# Patient Record
Sex: MALE | Race: WHITE | Employment: FULL TIME | URBAN - METROPOLITAN AREA
[De-identification: names, ages, dates, MRNs, and addresses within clinical notes are randomized per-mention and may not be internally consistent; named-entity substitution may affect disease eponyms.]

---

## 2017-10-16 ENCOUNTER — OFFICE VISIT (OUTPATIENT)
Dept: INTERNAL MEDICINE CLINIC | Age: 32
End: 2017-10-16

## 2017-10-16 VITALS
DIASTOLIC BLOOD PRESSURE: 80 MMHG | HEIGHT: 66 IN | HEART RATE: 72 BPM | WEIGHT: 161 LBS | RESPIRATION RATE: 16 BRPM | SYSTOLIC BLOOD PRESSURE: 122 MMHG | BODY MASS INDEX: 25.88 KG/M2

## 2017-10-16 DIAGNOSIS — Z00.00 ANNUAL PHYSICAL EXAM: Primary | ICD-10-CM

## 2017-10-16 DIAGNOSIS — F43.22 ADJUSTMENT DISORDER WITH ANXIETY: ICD-10-CM

## 2017-10-16 DIAGNOSIS — R53.83 FATIGUE, UNSPECIFIED TYPE: ICD-10-CM

## 2017-10-16 DIAGNOSIS — Z11.4 SCREENING FOR HIV (HUMAN IMMUNODEFICIENCY VIRUS): ICD-10-CM

## 2017-10-16 LAB
BASOPHILS ABSOLUTE: 0.1 K/UL (ref 0–0.2)
BASOPHILS RELATIVE PERCENT: 0.5 %
EOSINOPHILS ABSOLUTE: 0.2 K/UL (ref 0–0.6)
EOSINOPHILS RELATIVE PERCENT: 1.8 %
HCT VFR BLD CALC: 46.7 % (ref 40.5–52.5)
HEMOGLOBIN: 16 G/DL (ref 13.5–17.5)
LYMPHOCYTES ABSOLUTE: 3.4 K/UL (ref 1–5.1)
LYMPHOCYTES RELATIVE PERCENT: 28.7 %
MCH RBC QN AUTO: 31.3 PG (ref 26–34)
MCHC RBC AUTO-ENTMCNC: 34.2 G/DL (ref 31–36)
MCV RBC AUTO: 91.5 FL (ref 80–100)
MONOCYTES ABSOLUTE: 1 K/UL (ref 0–1.3)
MONOCYTES RELATIVE PERCENT: 8 %
NEUTROPHILS ABSOLUTE: 7.3 K/UL (ref 1.7–7.7)
NEUTROPHILS RELATIVE PERCENT: 61 %
PDW BLD-RTO: 14.3 % (ref 12.4–15.4)
PLATELET # BLD: 169 K/UL (ref 135–450)
PMV BLD AUTO: 10.6 FL (ref 5–10.5)
RBC # BLD: 5.1 M/UL (ref 4.2–5.9)
WBC # BLD: 12 K/UL (ref 4–11)

## 2017-10-16 PROCEDURE — 36415 COLL VENOUS BLD VENIPUNCTURE: CPT | Performed by: INTERNAL MEDICINE

## 2017-10-16 PROCEDURE — 99213 OFFICE O/P EST LOW 20 MIN: CPT | Performed by: INTERNAL MEDICINE

## 2017-10-16 PROCEDURE — 99395 PREV VISIT EST AGE 18-39: CPT | Performed by: INTERNAL MEDICINE

## 2017-10-16 RX ORDER — ESCITALOPRAM OXALATE 10 MG/1
10 TABLET ORAL DAILY
Qty: 30 TABLET | Refills: 0 | Status: SHIPPED | OUTPATIENT
Start: 2017-10-16 | End: 2018-05-03

## 2017-10-16 NOTE — PATIENT INSTRUCTIONS
Preventive plan of care for Heather Purvis        10/16/2017           Preventive Measures Status       Recommendations for screening           Diabetes Screen  Glucose (mg/dL)   Date Value   11/16/2016 87    Test recommended and ordered   Cholesterol Screen  Lab Results   Component Value Date    CHOL 130 11/16/2016    TRIG 117 11/16/2016    HDL 37 (L) 11/16/2016    LDLCALC 70 11/16/2016    Test recommended and ordered   Aspirin for Cardiovascular Prevention   No Not indicated   Weight: Body mass index is 25.99 kg/m².   5' 6\" (1.676 m)161 lb (73 kg)  Your BMI is between 18.5 and 24.9, which indicates that you are at a healthy weight and Your BMI is 25 or greater, which indicates that you are overweight   Living Will: No Full Code    Recommended Immunizations    Immunization History   Administered Date(s) Administered    Influenza Virus Vaccine 09/01/2017    Influenza, Shobhaelin Oakley, 3 yrs and older, IM, Preservative Free 10/05/2016    Tdap (Boostrix, Adacel) 10/05/2016    Influenza vaccine:  recommended every fall         Other Recommendations ·   See a dentist every 6 months  · Try to get at least 30 minutes of exercise 3-5 days per week  · Always wear a seat belt when traveling in a car  · Always wear a helmet when riding a bicycle or motorcycle  · When exposed to the sun, use a sunscreen that protects against both UVA and UVB radiation with an SPF of 30 or greater- reapply every 2 to 3 hours or after sweating, drying off with a towel, or swimming

## 2017-10-16 NOTE — PROGRESS NOTES
[facility] Primary [facility]  History and Physical  Jay Riley M.D. Guzman Banks  YOB: 1985    Date of Service:  10/16/2017    Chief Complaint:   Guzman Banks is a 32 y.o. male who presents for   Chief Complaint   Patient presents with    Annual Exam    Anxiety       HPI: Here for Annual Physical and Follow up. He complains getting irritated easily when someone gets mad at him and dwell on it for days. He was on Celexa 20 mg qd only for a few weeks last year and says it didn't help. He's depressed since divorce 4 months ago and his kids moved to [place] for about 3 months. He was started on Celexa 20 mg qd in the ER about 2 months ago and felt calm on it but ran out about 3 weeks ago. He denies SI/HI. He lost about 20 # with diarrhea initially due to anxiety but better now. He also complains of being tired all the time and occasionally ED when he's not really \"in the moment\" when he's having intercourse. ADD: stable off Vyvanse. His job is routine and not need any medication.     No results found for: LABA1C, LABMICR  Lab Results   Component Value Date     11/16/2016    K 4.1 11/16/2016     11/16/2016    CO2 26 11/16/2016    BUN 13 11/16/2016    CREATININE 0.7 (L) 11/16/2016    GLUCOSE 87 11/16/2016    CALCIUM 9.7 11/16/2016     Lab Results   Component Value Date    CHOL 130 11/16/2016    TRIG 117 11/16/2016    HDL 37 11/16/2016    LDLCALC 70 11/16/2016     Lab Results   Component Value Date    ALT 23 11/16/2016    AST 26 11/16/2016     No results found for: TSH, T4FREE  Lab Results   Component Value Date    WBC 13.4 (H) 11/16/2016    HGB 15.9 11/16/2016    HCT 47.2 11/16/2016    MCV 90.1 11/16/2016     11/16/2016     No results found for: INR   No results found for: PSA   No results found for: LABURIC     Wt Readings from Last 3 Encounters:   10/16/17 161 lb (73 kg)   10/15/17 165 lb (74.8 kg)   04/13/17 170 lb (77.1 kg)     BP Readings from Free  -     TSH without Reflex  -     Vitamin D 25 Hydroxy  -     Vitamin B12 & Folate        Return 1 month on anxiety.

## 2017-10-17 LAB
A/G RATIO: 2.1 (ref 1.1–2.2)
ALBUMIN SERPL-MCNC: 5 G/DL (ref 3.4–5)
ALP BLD-CCNC: 102 U/L (ref 40–129)
ALT SERPL-CCNC: 19 U/L (ref 10–40)
ANION GAP SERPL CALCULATED.3IONS-SCNC: 17 MMOL/L (ref 3–16)
AST SERPL-CCNC: 19 U/L (ref 15–37)
BILIRUB SERPL-MCNC: 0.4 MG/DL (ref 0–1)
BUN BLDV-MCNC: 12 MG/DL (ref 7–20)
CALCIUM SERPL-MCNC: 9.8 MG/DL (ref 8.3–10.6)
CHLORIDE BLD-SCNC: 100 MMOL/L (ref 99–110)
CHOLESTEROL, TOTAL: 146 MG/DL (ref 0–199)
CO2: 25 MMOL/L (ref 21–32)
CREAT SERPL-MCNC: 0.6 MG/DL (ref 0.9–1.3)
FOLATE: 3.78 NG/ML (ref 4.78–24.2)
GFR AFRICAN AMERICAN: >60
GFR NON-AFRICAN AMERICAN: >60
GLOBULIN: 2.4 G/DL
GLUCOSE BLD-MCNC: 76 MG/DL (ref 70–99)
HDLC SERPL-MCNC: 40 MG/DL (ref 40–60)
HIV-1 AND HIV-2 ANTIBODIES: NORMAL
LDL CHOLESTEROL CALCULATED: 91 MG/DL
POTASSIUM SERPL-SCNC: 4.3 MMOL/L (ref 3.5–5.1)
SODIUM BLD-SCNC: 142 MMOL/L (ref 136–145)
T4 FREE: 1.2 NG/DL (ref 0.9–1.8)
TOTAL PROTEIN: 7.4 G/DL (ref 6.4–8.2)
TRIGL SERPL-MCNC: 75 MG/DL (ref 0–150)
TSH SERPL DL<=0.05 MIU/L-ACNC: 3.3 UIU/ML (ref 0.27–4.2)
VITAMIN B-12: 915 PG/ML (ref 211–911)
VITAMIN D 25-HYDROXY: 15.6 NG/ML
VLDLC SERPL CALC-MCNC: 15 MG/DL

## 2017-10-23 DIAGNOSIS — R53.83 FATIGUE, UNSPECIFIED TYPE: Primary | ICD-10-CM

## 2017-10-26 LAB — TESTOSTERONE TOTAL: 533 NG/DL (ref 220–1000)

## 2019-10-22 ENCOUNTER — OFFICE VISIT (OUTPATIENT)
Dept: INTERNAL MEDICINE CLINIC | Age: 34
End: 2019-10-22
Payer: COMMERCIAL

## 2019-10-22 VITALS
OXYGEN SATURATION: 99 % | HEART RATE: 62 BPM | DIASTOLIC BLOOD PRESSURE: 62 MMHG | HEIGHT: 67 IN | WEIGHT: 162 LBS | BODY MASS INDEX: 25.43 KG/M2 | SYSTOLIC BLOOD PRESSURE: 120 MMHG

## 2019-10-22 DIAGNOSIS — Z23 NEED FOR INFLUENZA VACCINATION: ICD-10-CM

## 2019-10-22 DIAGNOSIS — R53.83 OTHER FATIGUE: ICD-10-CM

## 2019-10-22 DIAGNOSIS — Z00.00 ANNUAL PHYSICAL EXAM: Primary | ICD-10-CM

## 2019-10-22 DIAGNOSIS — E55.9 VITAMIN D DEFICIENCY: ICD-10-CM

## 2019-10-22 DIAGNOSIS — L30.9 DERMATITIS: ICD-10-CM

## 2019-10-22 DIAGNOSIS — Z23 NEED FOR PNEUMOCOCCAL VACCINE: ICD-10-CM

## 2019-10-22 LAB
BASOPHILS ABSOLUTE: 0 K/UL (ref 0–0.2)
BASOPHILS RELATIVE PERCENT: 0.5 %
EOSINOPHILS ABSOLUTE: 0.1 K/UL (ref 0–0.6)
EOSINOPHILS RELATIVE PERCENT: 1.8 %
HCT VFR BLD CALC: 40 % (ref 40.5–52.5)
HEMOGLOBIN: 13.9 G/DL (ref 13.5–17.5)
LYMPHOCYTES ABSOLUTE: 3.1 K/UL (ref 1–5.1)
LYMPHOCYTES RELATIVE PERCENT: 40.2 %
MCH RBC QN AUTO: 30.6 PG (ref 26–34)
MCHC RBC AUTO-ENTMCNC: 34.7 G/DL (ref 31–36)
MCV RBC AUTO: 88.1 FL (ref 80–100)
MONOCYTES ABSOLUTE: 0.8 K/UL (ref 0–1.3)
MONOCYTES RELATIVE PERCENT: 10.3 %
NEUTROPHILS ABSOLUTE: 3.7 K/UL (ref 1.7–7.7)
NEUTROPHILS RELATIVE PERCENT: 47.2 %
PDW BLD-RTO: 13.9 % (ref 12.4–15.4)
PLATELET # BLD: 170 K/UL (ref 135–450)
PMV BLD AUTO: 9.9 FL (ref 5–10.5)
RBC # BLD: 4.54 M/UL (ref 4.2–5.9)
WBC # BLD: 7.8 K/UL (ref 4–11)

## 2019-10-22 PROCEDURE — 36415 COLL VENOUS BLD VENIPUNCTURE: CPT | Performed by: INTERNAL MEDICINE

## 2019-10-22 PROCEDURE — 99214 OFFICE O/P EST MOD 30 MIN: CPT | Performed by: INTERNAL MEDICINE

## 2019-10-22 PROCEDURE — G8419 CALC BMI OUT NRM PARAM NOF/U: HCPCS | Performed by: INTERNAL MEDICINE

## 2019-10-22 PROCEDURE — 99395 PREV VISIT EST AGE 18-39: CPT | Performed by: INTERNAL MEDICINE

## 2019-10-22 PROCEDURE — G8482 FLU IMMUNIZE ORDER/ADMIN: HCPCS | Performed by: INTERNAL MEDICINE

## 2019-10-22 PROCEDURE — 90472 IMMUNIZATION ADMIN EACH ADD: CPT | Performed by: INTERNAL MEDICINE

## 2019-10-22 PROCEDURE — G8427 DOCREV CUR MEDS BY ELIG CLIN: HCPCS | Performed by: INTERNAL MEDICINE

## 2019-10-22 PROCEDURE — 1036F TOBACCO NON-USER: CPT | Performed by: INTERNAL MEDICINE

## 2019-10-22 PROCEDURE — 90688 IIV4 VACCINE SPLT 0.5 ML IM: CPT | Performed by: INTERNAL MEDICINE

## 2019-10-22 PROCEDURE — 90471 IMMUNIZATION ADMIN: CPT | Performed by: INTERNAL MEDICINE

## 2019-10-22 PROCEDURE — 90732 PPSV23 VACC 2 YRS+ SUBQ/IM: CPT | Performed by: INTERNAL MEDICINE

## 2019-10-22 ASSESSMENT — PATIENT HEALTH QUESTIONNAIRE - PHQ9
SUM OF ALL RESPONSES TO PHQ9 QUESTIONS 1 & 2: 0
SUM OF ALL RESPONSES TO PHQ QUESTIONS 1-9: 0
1. LITTLE INTEREST OR PLEASURE IN DOING THINGS: 0
2. FEELING DOWN, DEPRESSED OR HOPELESS: 0
SUM OF ALL RESPONSES TO PHQ QUESTIONS 1-9: 0

## 2019-10-23 DIAGNOSIS — E55.9 VITAMIN D DEFICIENCY: Primary | ICD-10-CM

## 2019-10-23 LAB
A/G RATIO: 2 (ref 1.1–2.2)
ALBUMIN SERPL-MCNC: 4.8 G/DL (ref 3.4–5)
ALP BLD-CCNC: 112 U/L (ref 40–129)
ALT SERPL-CCNC: 31 U/L (ref 10–40)
ANION GAP SERPL CALCULATED.3IONS-SCNC: 16 MMOL/L (ref 3–16)
AST SERPL-CCNC: 31 U/L (ref 15–37)
BILIRUB SERPL-MCNC: 0.4 MG/DL (ref 0–1)
BUN BLDV-MCNC: 12 MG/DL (ref 7–20)
CALCIUM SERPL-MCNC: 9.7 MG/DL (ref 8.3–10.6)
CHLORIDE BLD-SCNC: 103 MMOL/L (ref 99–110)
CHOLESTEROL, TOTAL: 139 MG/DL (ref 0–199)
CO2: 25 MMOL/L (ref 21–32)
CREAT SERPL-MCNC: 0.7 MG/DL (ref 0.9–1.3)
GFR AFRICAN AMERICAN: >60
GFR NON-AFRICAN AMERICAN: >60
GLOBULIN: 2.4 G/DL
GLUCOSE BLD-MCNC: 92 MG/DL (ref 70–99)
HDLC SERPL-MCNC: 43 MG/DL (ref 40–60)
LDL CHOLESTEROL CALCULATED: 78 MG/DL
POTASSIUM SERPL-SCNC: 4.3 MMOL/L (ref 3.5–5.1)
SODIUM BLD-SCNC: 144 MMOL/L (ref 136–145)
TOTAL PROTEIN: 7.2 G/DL (ref 6.4–8.2)
TRIGL SERPL-MCNC: 90 MG/DL (ref 0–150)
TSH REFLEX: 3.06 UIU/ML (ref 0.27–4.2)
VITAMIN D 25-HYDROXY: 19.5 NG/ML
VLDLC SERPL CALC-MCNC: 18 MG/DL

## 2019-10-23 RX ORDER — ERGOCALCIFEROL 1.25 MG/1
50000 CAPSULE ORAL WEEKLY
Qty: 4 CAPSULE | Refills: 2 | Status: SHIPPED | OUTPATIENT
Start: 2019-10-23 | End: 2020-10-26

## 2020-02-20 ENCOUNTER — OFFICE VISIT (OUTPATIENT)
Dept: INTERNAL MEDICINE CLINIC | Age: 35
End: 2020-02-20
Payer: COMMERCIAL

## 2020-02-20 VITALS
HEART RATE: 97 BPM | HEIGHT: 67 IN | BODY MASS INDEX: 23.7 KG/M2 | SYSTOLIC BLOOD PRESSURE: 132 MMHG | DIASTOLIC BLOOD PRESSURE: 78 MMHG | WEIGHT: 151 LBS | OXYGEN SATURATION: 99 %

## 2020-02-20 PROBLEM — K58.0 IRRITABLE BOWEL SYNDROME WITH DIARRHEA: Status: ACTIVE | Noted: 2020-02-20

## 2020-02-20 PROCEDURE — G8420 CALC BMI NORM PARAMETERS: HCPCS | Performed by: INTERNAL MEDICINE

## 2020-02-20 PROCEDURE — G8427 DOCREV CUR MEDS BY ELIG CLIN: HCPCS | Performed by: INTERNAL MEDICINE

## 2020-02-20 PROCEDURE — 1036F TOBACCO NON-USER: CPT | Performed by: INTERNAL MEDICINE

## 2020-02-20 PROCEDURE — 99214 OFFICE O/P EST MOD 30 MIN: CPT | Performed by: INTERNAL MEDICINE

## 2020-02-20 PROCEDURE — G8482 FLU IMMUNIZE ORDER/ADMIN: HCPCS | Performed by: INTERNAL MEDICINE

## 2020-02-20 RX ORDER — OMEPRAZOLE 20 MG/1
20 CAPSULE, DELAYED RELEASE ORAL DAILY
Qty: 30 CAPSULE | Refills: 0 | Status: SHIPPED | OUTPATIENT
Start: 2020-02-20 | End: 2020-03-23 | Stop reason: SDUPTHER

## 2020-02-20 ASSESSMENT — PATIENT HEALTH QUESTIONNAIRE - PHQ9
1. LITTLE INTEREST OR PLEASURE IN DOING THINGS: 0
SUM OF ALL RESPONSES TO PHQ QUESTIONS 1-9: 0
2. FEELING DOWN, DEPRESSED OR HOPELESS: 0
SUM OF ALL RESPONSES TO PHQ QUESTIONS 1-9: 0
SUM OF ALL RESPONSES TO PHQ9 QUESTIONS 1 & 2: 0

## 2020-02-20 NOTE — PROGRESS NOTES
Brittni LOZA Luis Daniel Rocha  YOB: 1985    Date of Service:  2/20/2020    Chief Complaint:      Chief Complaint   Patient presents with    Anxiety    Other     difficulty concentrating       HPI:  Brittni LOZA Luis Daniel Rocha is a 29 y.o. He complains of having anxiety problems with increasing diarrhea last week for the past year due to money problems with his fiance dx with cervical cancer and not able to work. He worries about money and now having relationship and communicating problems with his fiancee. Not have much stress at work. He dwell on things and can't seem to get over it quickly. IBS with diarrhea daily 3-4 times a day for 3 weeks last month but now decrease to couple of times a week. He complain of acid reflux daily for many years and using OTC antacid. He feels like liquid and food get stuck almost daily.     No results found for: LABA1C, LABMICR  Lab Results   Component Value Date     10/22/2019    K 4.3 10/22/2019     10/22/2019    CO2 25 10/22/2019    BUN 12 10/22/2019    CREATININE 0.7 (L) 10/22/2019    GLUCOSE 92 10/22/2019    CALCIUM 9.7 10/22/2019     Lab Results   Component Value Date    CHOL 139 10/22/2019    TRIG 90 10/22/2019    HDL 43 10/22/2019    LDLCALC 78 10/22/2019     Lab Results   Component Value Date    ALT 31 10/22/2019    AST 31 10/22/2019     Lab Results   Component Value Date    TSH 3.30 10/16/2017    T4FREE 1.2 10/16/2017     Lab Results   Component Value Date    WBC 7.8 10/22/2019    HGB 13.9 10/22/2019    HCT 40.0 (L) 10/22/2019    MCV 88.1 10/22/2019     10/22/2019     No results found for: INR   No results found for: PSA   No results found for: [facility]     Patient Active Problem List   Diagnosis    Adjustment disorder with anxiety    ADD (attention deficit disorder)    Controlled substance agreement signed    Vitamin D deficiency       Allergies   Allergen Reactions    Amoxicillin Rash     No outpatient medications have been

## 2020-03-23 ENCOUNTER — OFFICE VISIT (OUTPATIENT)
Dept: INTERNAL MEDICINE CLINIC | Age: 35
End: 2020-03-23
Payer: COMMERCIAL

## 2020-03-23 VITALS
DIASTOLIC BLOOD PRESSURE: 62 MMHG | OXYGEN SATURATION: 99 % | SYSTOLIC BLOOD PRESSURE: 114 MMHG | HEIGHT: 67 IN | BODY MASS INDEX: 24.8 KG/M2 | WEIGHT: 158 LBS | HEART RATE: 59 BPM

## 2020-03-23 PROCEDURE — G8427 DOCREV CUR MEDS BY ELIG CLIN: HCPCS | Performed by: INTERNAL MEDICINE

## 2020-03-23 PROCEDURE — 99213 OFFICE O/P EST LOW 20 MIN: CPT | Performed by: INTERNAL MEDICINE

## 2020-03-23 PROCEDURE — 1036F TOBACCO NON-USER: CPT | Performed by: INTERNAL MEDICINE

## 2020-03-23 PROCEDURE — G8482 FLU IMMUNIZE ORDER/ADMIN: HCPCS | Performed by: INTERNAL MEDICINE

## 2020-03-23 PROCEDURE — G8420 CALC BMI NORM PARAMETERS: HCPCS | Performed by: INTERNAL MEDICINE

## 2020-03-23 RX ORDER — OMEPRAZOLE 20 MG/1
20 CAPSULE, DELAYED RELEASE ORAL DAILY
Qty: 30 CAPSULE | Refills: 6 | Status: SHIPPED
Start: 2020-03-23 | End: 2020-10-28 | Stop reason: ALTCHOICE

## 2020-03-23 NOTE — PROGRESS NOTES
Brittni LOZA Luis Daniel Rocha  YOB: 1985    Date of Service:  3/23/2020    Chief Complaint:      Chief Complaint   Patient presents with    Anxiety    Gastroesophageal Reflux       HPI:  Brittni Cary Dr is a 29 y.o. Anxiety: much improved and no more diarrhea since started on Zoloft 50 mg qd. He is back together with his fiance. He worries about money and now having relationship and communicating problems with his fiancee. Not have much stress at work.     IBS with diarrhea: Much improved and only have it with greasy food but not had one for over 2 weeks since started on zoloft.     GERD:  Stable on Prilosec 20 mg qd.     No results found for: LABA1C, LABMICR  Lab Results   Component Value Date     10/22/2019    K 4.3 10/22/2019     10/22/2019    CO2 25 10/22/2019    BUN 12 10/22/2019    CREATININE 0.7 (L) 10/22/2019    GLUCOSE 92 10/22/2019    CALCIUM 9.7 10/22/2019     Lab Results   Component Value Date    CHOL 139 10/22/2019    TRIG 90 10/22/2019    HDL 43 10/22/2019    LDLCALC 78 10/22/2019     Lab Results   Component Value Date    ALT 31 10/22/2019    AST 31 10/22/2019     Lab Results   Component Value Date    TSH 3.30 10/16/2017    T4FREE 1.2 10/16/2017     Lab Results   Component Value Date    WBC 7.8 10/22/2019    HGB 13.9 10/22/2019    HCT 40.0 (L) 10/22/2019    MCV 88.1 10/22/2019     10/22/2019     No results found for: INR   No results found for: PSA   No results found for: [address]     Patient Active Problem List   Diagnosis    Adjustment disorder with anxiety    ADD (attention deficit disorder)    Controlled substance agreement signed    Vitamin D deficiency    Irritable bowel syndrome with diarrhea       Allergies   Allergen Reactions    Amoxicillin Rash     Outpatient Medications Marked as Taking for the 3/23/20 encounter (Office Visit) with Manny Valadez MD   Medication Sig Dispense Refill    sertraline (ZOLOFT) 50 MG tablet Take 1 tablet by mouth

## 2020-06-10 ENCOUNTER — HOSPITAL ENCOUNTER (EMERGENCY)
Age: 35
Discharge: HOME | End: 2020-06-10
Payer: COMMERCIAL

## 2020-06-10 VITALS
BODY MASS INDEX: 25.01 KG/M2 | DIASTOLIC BLOOD PRESSURE: 60 MMHG | RESPIRATION RATE: 14 BRPM | OXYGEN SATURATION: 98 % | SYSTOLIC BLOOD PRESSURE: 107 MMHG | HEART RATE: 72 BPM | HEIGHT: 68 IN | WEIGHT: 165 LBS | TEMPERATURE: 98.2 F

## 2020-06-10 PROCEDURE — 99282 EMERGENCY DEPT VISIT SF MDM: CPT

## 2020-06-10 RX ORDER — CEPHALEXIN 500 MG/1
500 CAPSULE ORAL 4 TIMES DAILY
Qty: 20 CAPSULE | Refills: 0 | Status: SHIPPED | OUTPATIENT
Start: 2020-06-10 | End: 2020-06-15

## 2020-06-10 ASSESSMENT — ENCOUNTER SYMPTOMS
ROS SKIN COMMENTS: FB
VOMITING: 0
COLOR CHANGE: 0
NAUSEA: 0

## 2020-10-26 ENCOUNTER — APPOINTMENT (OUTPATIENT)
Dept: CT IMAGING | Age: 35
End: 2020-10-26
Payer: COMMERCIAL

## 2020-10-26 ENCOUNTER — HOSPITAL ENCOUNTER (EMERGENCY)
Age: 35
Discharge: HOME | End: 2020-10-26
Payer: COMMERCIAL

## 2020-10-26 ENCOUNTER — TELEPHONE (OUTPATIENT)
Dept: INTERNAL MEDICINE CLINIC | Age: 35
End: 2020-10-26

## 2020-10-26 VITALS
SYSTOLIC BLOOD PRESSURE: 115 MMHG | TEMPERATURE: 97.1 F | OXYGEN SATURATION: 100 % | RESPIRATION RATE: 18 BRPM | DIASTOLIC BLOOD PRESSURE: 82 MMHG | WEIGHT: 165 LBS | HEIGHT: 67 IN | HEART RATE: 80 BPM | BODY MASS INDEX: 25.9 KG/M2

## 2020-10-26 LAB
A/G RATIO: 2 (ref 1.1–2.2)
ALBUMIN SERPL-MCNC: 4.5 G/DL (ref 3.4–5)
ALP BLD-CCNC: 96 U/L (ref 40–129)
ALT SERPL-CCNC: 25 U/L (ref 10–40)
ANION GAP SERPL CALCULATED.3IONS-SCNC: 6 MMOL/L (ref 3–16)
AST SERPL-CCNC: 34 U/L (ref 15–37)
BASOPHILS ABSOLUTE: 0.1 K/UL (ref 0–0.2)
BASOPHILS RELATIVE PERCENT: 0.8 %
BILIRUB SERPL-MCNC: 0.3 MG/DL (ref 0–1)
BILIRUBIN URINE: NEGATIVE
BLOOD, URINE: NEGATIVE
BUN BLDV-MCNC: 18 MG/DL (ref 7–20)
CALCIUM SERPL-MCNC: 9.7 MG/DL (ref 8.3–10.6)
CHLORIDE BLD-SCNC: 100 MMOL/L (ref 99–110)
CLARITY: CLEAR
CO2: 29 MMOL/L (ref 21–32)
COLOR: YELLOW
CREAT SERPL-MCNC: 0.8 MG/DL (ref 0.9–1.3)
EOSINOPHILS ABSOLUTE: 0.2 K/UL (ref 0–0.6)
EOSINOPHILS RELATIVE PERCENT: 2.4 %
GFR AFRICAN AMERICAN: >60
GFR NON-AFRICAN AMERICAN: >60
GLOBULIN: 2.3 G/DL
GLUCOSE BLD-MCNC: 80 MG/DL (ref 70–99)
GLUCOSE URINE: NEGATIVE MG/DL
HCT VFR BLD CALC: 38.9 % (ref 40.5–52.5)
HEMOGLOBIN: 13.4 G/DL (ref 13.5–17.5)
KETONES, URINE: NEGATIVE MG/DL
LEUKOCYTE ESTERASE, URINE: NEGATIVE
LIPASE: 44 U/L (ref 13–60)
LYMPHOCYTES ABSOLUTE: 3.4 K/UL (ref 1–5.1)
LYMPHOCYTES RELATIVE PERCENT: 42.4 %
MCH RBC QN AUTO: 30.9 PG (ref 26–34)
MCHC RBC AUTO-ENTMCNC: 34.5 G/DL (ref 31–36)
MCV RBC AUTO: 89.6 FL (ref 80–100)
MICROSCOPIC EXAMINATION: NORMAL
MONOCYTES ABSOLUTE: 0.8 K/UL (ref 0–1.3)
MONOCYTES RELATIVE PERCENT: 10.1 %
NEUTROPHILS ABSOLUTE: 3.6 K/UL (ref 1.7–7.7)
NEUTROPHILS RELATIVE PERCENT: 44.3 %
NITRITE, URINE: NEGATIVE
PDW BLD-RTO: 13.6 % (ref 12.4–15.4)
PH UA: 6 (ref 5–8)
PLATELET # BLD: 174 K/UL (ref 135–450)
PMV BLD AUTO: 9.4 FL (ref 5–10.5)
POTASSIUM REFLEX MAGNESIUM: 4.2 MMOL/L (ref 3.5–5.1)
PROTEIN UA: NEGATIVE MG/DL
RBC # BLD: 4.34 M/UL (ref 4.2–5.9)
SODIUM BLD-SCNC: 135 MMOL/L (ref 136–145)
SPECIFIC GRAVITY UA: >=1.03 (ref 1–1.03)
TOTAL PROTEIN: 6.8 G/DL (ref 6.4–8.2)
URINE REFLEX TO CULTURE: NORMAL
URINE TYPE: NORMAL
UROBILINOGEN, URINE: 0.2 E.U./DL
WBC # BLD: 8.1 K/UL (ref 4–11)

## 2020-10-26 PROCEDURE — 6360000004 HC RX CONTRAST MEDICATION: Performed by: PHYSICIAN ASSISTANT

## 2020-10-26 PROCEDURE — 85025 COMPLETE CBC W/AUTO DIFF WBC: CPT

## 2020-10-26 PROCEDURE — 81003 URINALYSIS AUTO W/O SCOPE: CPT

## 2020-10-26 PROCEDURE — 99284 EMERGENCY DEPT VISIT MOD MDM: CPT

## 2020-10-26 PROCEDURE — C9113 INJ PANTOPRAZOLE SODIUM, VIA: HCPCS | Performed by: PHYSICIAN ASSISTANT

## 2020-10-26 PROCEDURE — 96365 THER/PROPH/DIAG IV INF INIT: CPT

## 2020-10-26 PROCEDURE — 83690 ASSAY OF LIPASE: CPT

## 2020-10-26 PROCEDURE — 6370000000 HC RX 637 (ALT 250 FOR IP): Performed by: PHYSICIAN ASSISTANT

## 2020-10-26 PROCEDURE — 74177 CT ABD & PELVIS W/CONTRAST: CPT

## 2020-10-26 PROCEDURE — 80053 COMPREHEN METABOLIC PANEL: CPT

## 2020-10-26 PROCEDURE — 6360000002 HC RX W HCPCS: Performed by: PHYSICIAN ASSISTANT

## 2020-10-26 PROCEDURE — 2580000003 HC RX 258: Performed by: PHYSICIAN ASSISTANT

## 2020-10-26 RX ORDER — METRONIDAZOLE 500 MG/1
500 TABLET ORAL 2 TIMES DAILY
Qty: 20 TABLET | Refills: 0 | Status: SHIPPED | OUTPATIENT
Start: 2020-10-26 | End: 2020-11-05

## 2020-10-26 RX ORDER — 0.9 % SODIUM CHLORIDE 0.9 %
1000 INTRAVENOUS SOLUTION INTRAVENOUS ONCE
Status: COMPLETED | OUTPATIENT
Start: 2020-10-26 | End: 2020-10-26

## 2020-10-26 RX ORDER — CIPROFLOXACIN 500 MG/1
500 TABLET, FILM COATED ORAL 2 TIMES DAILY
Qty: 14 TABLET | Refills: 0 | Status: SHIPPED | OUTPATIENT
Start: 2020-10-26 | End: 2020-11-02

## 2020-10-26 RX ORDER — METRONIDAZOLE 250 MG/1
500 TABLET ORAL ONCE
Status: COMPLETED | OUTPATIENT
Start: 2020-10-26 | End: 2020-10-26

## 2020-10-26 RX ORDER — PANTOPRAZOLE SODIUM 40 MG/1
40 TABLET, DELAYED RELEASE ORAL
Qty: 60 TABLET | Refills: 0 | Status: SHIPPED | OUTPATIENT
Start: 2020-10-26 | End: 2020-12-28 | Stop reason: SDUPTHER

## 2020-10-26 RX ORDER — CIPROFLOXACIN 500 MG/1
500 TABLET, FILM COATED ORAL ONCE
Status: COMPLETED | OUTPATIENT
Start: 2020-10-26 | End: 2020-10-26

## 2020-10-26 RX ADMIN — SODIUM CHLORIDE 1000 ML: 9 INJECTION, SOLUTION INTRAVENOUS at 19:41

## 2020-10-26 RX ADMIN — METRONIDAZOLE 500 MG: 250 TABLET ORAL at 21:56

## 2020-10-26 RX ADMIN — SODIUM CHLORIDE 8 MG/HR: 9 INJECTION, SOLUTION INTRAVENOUS at 20:56

## 2020-10-26 RX ADMIN — SODIUM CHLORIDE 80 MG: 9 INJECTION, SOLUTION INTRAVENOUS at 20:37

## 2020-10-26 RX ADMIN — LINACLOTIDE 145 MCG: 145 CAPSULE, GELATIN COATED ORAL at 19:41

## 2020-10-26 RX ADMIN — CIPROFLOXACIN 500 MG: 500 TABLET, FILM COATED ORAL at 21:56

## 2020-10-26 RX ADMIN — IOPAMIDOL 75 ML: 755 INJECTION, SOLUTION INTRAVENOUS at 20:12

## 2020-10-26 ASSESSMENT — PAIN DESCRIPTION - LOCATION: LOCATION: ABDOMEN

## 2020-10-26 ASSESSMENT — PAIN DESCRIPTION - PAIN TYPE: TYPE: ACUTE PAIN

## 2020-10-26 ASSESSMENT — PAIN DESCRIPTION - DESCRIPTORS: DESCRIPTORS: DISCOMFORT;PRESSURE

## 2020-10-26 ASSESSMENT — PAIN SCALES - GENERAL: PAINLEVEL_OUTOF10: 1

## 2020-10-26 ASSESSMENT — PAIN DESCRIPTION - FREQUENCY: FREQUENCY: CONTINUOUS

## 2020-10-26 NOTE — TELEPHONE ENCOUNTER
Patient self scheduled an appointment for Wednesday stating \"My stool looks like coffee grounds and vomiting tar like substance. I have been having severe acid reflux. Was wanting to see if I can a GI doctor. \"    I called and spoke to patient, he states he can't do a sooner appointment due to work, told him that his symptoms were concerning, he denies fatigue & dizziness/lighteadedness at this time. States that acid reflux has been going on a while but Saturday evening after drinking severe symptoms started. Yesterday had stomach pain.  Today fells fine other than acid reflux but no bowel movement yet to know if stool is back to normal

## 2020-10-26 NOTE — ED TRIAGE NOTES
Pt states he has history of acid reflux. States that yesterday he started having nausea vomiting and throwing up dark coffee color. Called Md and was told to come to ED.

## 2020-10-26 NOTE — TELEPHONE ENCOUNTER
Left message for patient letting him know that Dr. García Chin strongly suggests at least a video visit today or if he is actively having symptoms to go to the ER since he may be bleeding.

## 2020-10-26 NOTE — LETTER
[facility] [place] [facility]  [address] [address]  Phone: [phone]               October 26, 2020    Patient: Janki Luciano   YOB: 1985   Date of Visit: 10/26/2020       To Whom It May Concern:    Janki Luciano was seen and treated in our emergency department on 10/26/2020. He may return to work on 10/27/20.       Sincerely,       Hermila Arenas RN         Signature:__________________________________

## 2020-10-27 ASSESSMENT — ENCOUNTER SYMPTOMS
SHORTNESS OF BREATH: 0
SINUS PRESSURE: 0
CONSTIPATION: 0
SINUS PAIN: 0
EYE DISCHARGE: 0
VOMITING: 1
ABDOMINAL PAIN: 1
RHINORRHEA: 0
DIARRHEA: 0
COUGH: 0
CHEST TIGHTNESS: 0
NAUSEA: 1
SORE THROAT: 0
EYE REDNESS: 0

## 2020-10-27 NOTE — ED PROVIDER NOTES
Evaluated by Advanced Practice Provider          [facility]  EMERGENCY DEPARTMENT ENCOUNTER      This patient was not seen and evaluated by the attending physician No att. providers found. I have independently evaluated this patient. Pt Name: Juan Luis Tanner  MRN: [id number]  Mariel 1985  Dateof evaluation: 10/26/2020  Provider: Cristina Coates PA-C  PCP: Maria Eugenia Haywood MD  [address] [place] [address]       Chief Complaint   Patient presents with    Emesis     coffee colored looking emesis all day yesterday       HISTORY OF PRESENTILLNESS   (Location/Symptom, Timing/Onset, Context/Setting, Quality, Duration, Modifying Factors, Severity)  Note limiting factors. Juan Luis Tanner is a 28 y.o. male for evaluation of a 3-day history of intermittent episodes of abdominal pain, 2/10, cramping pain, along with a 1 day history of emesis that he said looked like it was dark-colored and had coffee appearance. Patient called his doctor who advised him to come into the ER patient denied having any fever no diarrhea patient is not taking blood thinners denies any excess NSAID use says he has a very infrequent alcohol consumer. Nursing Notes were all reviewed and agreed with or any disagreements were addressed  in the HPI. REVIEW OF SYSTEMS    (2-9 systems for level 4, 10 or more for level 5)     Review of Systems   Constitutional: Negative for chills and fever. HENT: Negative. Negative for congestion, rhinorrhea, sinus pressure, sinus pain and sore throat. Eyes: Negative for discharge, redness and visual disturbance. Respiratory: Negative for cough, chest tightness and shortness of breath. Cardiovascular: Negative for chest pain and palpitations. Gastrointestinal: Positive for abdominal pain, nausea and vomiting. Negative for constipation and diarrhea. Genitourinary: Negative for difficulty urinating, dysuria and frequency. Musculoskeletal: Negative. Skin: Negative. Neurological: Negative. Negative for dizziness, weakness, numbness and headaches. Psychiatric/Behavioral: Negative. All other systems reviewed and are negative. Positives and Pertinent negatives as per HPI. Except as noted above in the ROS, all other systems were reviewed and negative.        PAST MEDICAL HISTORY     Past Medical History:   Diagnosis Date    ADD (attention deficit disorder) 2016    GERD (gastroesophageal reflux disease)     Irritable bowel syndrome with diarrhea 2020         SURGICAL HISTORY       Past Surgical History:   Procedure Laterality Date    NASAL SEPTUM SURGERY           CURRENT MEDICATIONS       Discharge Medication List as of 10/26/2020  9:53 PM      CONTINUE these medications which have NOT CHANGED    Details   omeprazole (PRILOSEC) 20 MG delayed release capsule Take 1 capsule by mouth Daily, Disp-30 capsule, R-6Normal               ALLERGIES     Amoxicillin    FAMILY HISTORY       Family History   Problem Relation Age of Onset    No Known Problems Mother     Heart Disease Father         2 heart attack     Heart Attack Father 36    No Known Problems Sister     No Known Problems Brother     Cancer Maternal Grandfather         colon cancer     Diabetes Paternal Grandmother     Cancer Paternal Grandmother         cervical cancer           SOCIAL HISTORY       Social History     Socioeconomic History    Marital status: Single     Spouse name: None    Number of children: None    Years of education: None    Highest education level: None   Occupational History    None   Social Needs    Financial resource strain: None    Food insecurity     Worry: None     Inability: None    Transportation needs     Medical: None     Non-medical: None   Tobacco Use    Smoking status: Current Every Day Smoker     Packs/day: 1.00     Years: 10.00     Pack years: 10.00     Types: Cigarettes     Last attempt to quit: 2019     Years since quittin.2    Smokeless tobacco: Never Used   Substance and Sexual Activity    Alcohol use: Yes     Comment: occasionally    Drug use: No    Sexual activity: Yes     Partners: Female   Lifestyle    Physical activity     Days per week: None     Minutes per session: None    Stress: None   Relationships    Social connections     Talks on phone: None     Gets together: None     Attends Yarsanism service: None     Active member of club or organization: None     Attends meetings of clubs or organizations: None     Relationship status: None    Intimate partner violence     Fear of current or ex partner: None     Emotionally abused: None     Physically abused: None     Forced sexual activity: None   Other Topics Concern    None   Social History Narrative    None       SCREENINGS     NIH Score       [facility] [place] Coma Scale  Eye Opening: Spontaneous  Best Verbal Response: Oriented  Best Motor Response: Obeys commands  Stan Coma Scale Score: 15    [facility]     Heart Score         PHYSICAL EXAM  (up to 7 for level 4, 8 or more for level 5)     ED Triage Vitals [10/26/20 1821]   BP Temp Temp Source Pulse Resp SpO2 Height Weight   133/70 97.1 °F (36.2 °C) Oral 85 18 100 % 5' 7\" (1.702 m) 165 lb (74.8 kg)       Physical Exam  Vitals signs and nursing note reviewed. Constitutional:       Appearance: He is well-developed. He is not diaphoretic. HENT:      Head: Normocephalic. Nose: Nose normal.      Mouth/Throat:      Pharynx: No oropharyngeal exudate. Eyes:      General:         Right eye: No discharge. Left eye: No discharge. Conjunctiva/sclera: Conjunctivae normal.      Pupils: Pupils are equal, round, and reactive to light. Neck:      Musculoskeletal: Normal range of motion. Cardiovascular:      Rate and Rhythm: Normal rate and regular rhythm. Heart sounds: Normal heart sounds. No murmur. No friction rub. No gallop.     Pulmonary:      Effort: Pulmonary effort is normal. No respiratory distress. Breath sounds: Normal breath sounds. No wheezing. Abdominal:      General: Bowel sounds are normal. There is no distension. Palpations: Abdomen is soft. Tenderness: There is abdominal tenderness. There is guarding. Comments: generalized   Musculoskeletal: Normal range of motion. Skin:     General: Skin is warm and dry. Capillary Refill: Capillary refill takes less than 2 seconds. Neurological:      General: No focal deficit present. Mental Status: He is alert. Psychiatric:         Behavior: Behavior normal.         DIAGNOSTIC RESULTS   LABS:    Labs Reviewed   CBC WITH AUTO DIFFERENTIAL - Abnormal; Notable for the following components:       Result Value    Hemoglobin 13.4 (*)     Hematocrit 38.9 (*)     All other components within normal limits    Narrative:     Performed at:  [facility],  [place], [facility]   Phone [phone]   COMPREHENSIVE METABOLIC PANEL W/ REFLEX TO MG FOR LOW K - Abnormal; Notable for the following components:    Sodium 135 (*)     CREATININE 0.8 (*)     All other components within normal limits    Narrative:     Performed at:  [facility],  [place], [facility]   Phone [phone]   LIPASE    Narrative:     Performed at:  [facility]) - [facility],  [place], [facility]   Phone [phone]   URINE RT REFLEX TO CULTURE    Narrative:     Performed at:  [facility]) - [facility],  [place], [facility]   Phone [phone]       All other labs werewithin normal range or not returned as of this dictation. EKG: All EKG's are interpreted by the Emergency Department Physician who either signs or Co-signs this chart in the absence of acardiologist.  Please see their note for interpretation of EKG.       RADIOLOGY:           Interpretation per the Radiologist below, if available at the time of this note:    CT ABDOMEN PELVIS W IV CONTRAST Additional Contrast? None   Final Result   1. Questionable mild thickening of the mid sigmoid colon, likely accentuated   by incomplete distention. The possibility of a mild colitis is raised. 2. Otherwise unremarkable CT of the abdomen and pelvis. No evidence of   appendicitis. 3. Moderate-sized hiatal hernia. Ct Abdomen Pelvis W Iv Contrast Additional Contrast? None    Result Date: 10/26/2020  EXAMINATION: CT OF THE ABDOMEN AND PELVIS WITH CONTRAST 10/26/2020 8:11 pm TECHNIQUE: CT of the abdomen and pelvis was performed with the administration of intravenous contrast. Multiplanar reformatted images are provided for review. Dose modulation, iterative reconstruction, and/or weight based adjustment of the mA/kV was utilized to reduce the radiation dose to as low as reasonably achievable. COMPARISON: None. HISTORY: ORDERING SYSTEM PROVIDED HISTORY: abd pain, concern for gi bleed TECHNOLOGIST PROVIDED HISTORY: Reason for exam:->abd pain, concern for gi bleed Additional Contrast?->None Reason for Exam: Emesis (coffee colored looking emesis all day yesterday) FINDINGS: Lower Chest: No acute infiltrate at the lung bases. Moderate-sized hiatal hernia. Organs: The liver, spleen, pancreas and adrenal glands are unremarkable. The gallbladder is contracted with no biliary dilatation. No renal mass or significant hydronephrosis. GI/Bowel: There is mild colonic wall thickening in the mid sigmoid colon, likely accentuated by incomplete distention but possibly a mild colitis. No significant pericolonic inflammatory changes. The appendix is unremarkable. No small bowel distension. The distal stomach and duodenal sweep are unremarkable. Pelvis: No pelvic mass or free pelvic fluid. The prostate is not enlarged. Partial distention of the urinary bladder. Mild bladder wall thickening is likely accentuated by incomplete distention. Peritoneum/Retroperitoneum: The abdominal aorta is normal in caliber. No retroperitoneal adenopathy or upper abdominal ascites. Bones/Soft Tissues: No acute osseous or soft tissue abnormality. 1. Questionable mild thickening of the mid sigmoid colon, likely accentuated by incomplete distention. The possibility of a mild colitis is raised. 2. Otherwise unremarkable CT of the abdomen and pelvis. No evidence of appendicitis. 3. Moderate-sized hiatal hernia. CONSULTS:  None      EMERGENCYDEPARTMENT COURSE and DIFFERENTIAL DIAGNOSIS/MDM:   Vitals:    Vitals:    10/26/20 1821 10/26/20 2039 10/26/20 2158   BP: 133/70 104/81 115/82   Pulse: 85 76 80   Resp: 18     Temp: 97.1 °F (36.2 °C)     TempSrc: Oral     SpO2: 100% 100% 100%   Weight: 165 lb (74.8 kg)     Height: 5' 7\" (1.702 m)         Patient was given the following medications:  Medications   0.9 % sodium chloride bolus (0 mLs Intravenous Stopped 10/26/20 2156)   linaclotide (LINZESS) capsule 145 mcg (145 mcg Oral Given 10/26/20 1941)   pantoprazole (PROTONIX) 80 mg in sodium chloride 0.9 % 50 mL bolus (0 mg Intravenous Stopped 10/26/20 2056)   iopamidol (ISOVUE-370) 76 % injection 75 mL (75 mLs Intravenous Given 10/26/20 2012)   ciprofloxacin (CIPRO) tablet 500 mg (500 mg Oral Given 10/26/20 2156)   metroNIDAZOLE (FLAGYL) tablet 500 mg (500 mg Oral Given 10/26/20 2156)           Afebrile, stable, patient presents to the ED for evaluation. Seen on my own, per my scope of practice, with attending ED provider available for consultation who agrees with assessment and plan. Nontoxic patient in no acute distress provided with treatment while in the ER Protonix Linzess in addition to IV fluids CT imaging was ordered which shows no evidence of obstruction.   Patient vomiting is under control patient will be started on antibiotics given a referral for GI patient is not on any anticoagulation hemoglobin is stable appropriate for discharge and follow-up as an outpatient with a low suspicion return to the ED. All questions are answered. Indications for return to the ED are discussed. Patient is advised if any new or worsening symptoms arise they should immediately return to the emergency room. Follow-up with primary care in 1-2 days. The patient tolerated their visit well. I have evaluated this patient. My supervising physician was available for consultation. The patient and / or the family were informed of the results of any tests, a time was given to answer questions, a plan was proposed and they agreed Karina Peters.     Results for orders placed or performed during the hospital encounter of 10/26/20   CBC Auto Differential   Result Value Ref Range    WBC 8.1 4.0 - 11.0 K/uL    RBC 4.34 4.20 - 5.90 M/uL    Hemoglobin 13.4 (L) 13.5 - 17.5 g/dL    Hematocrit 38.9 (L) 40.5 - 52.5 %    MCV 89.6 80.0 - 100.0 fL    MCH 30.9 26.0 - 34.0 pg    MCHC 34.5 31.0 - 36.0 g/dL    RDW 13.6 12.4 - 15.4 %    Platelets 994 528 - 676 K/uL    MPV 9.4 5.0 - 10.5 fL    Neutrophils % 44.3 %    Lymphocytes % 42.4 %    Monocytes % 10.1 %    Eosinophils % 2.4 %    Basophils % 0.8 %    Neutrophils Absolute 3.6 1.7 - 7.7 K/uL    Lymphocytes Absolute 3.4 1.0 - 5.1 K/uL    Monocytes Absolute 0.8 0.0 - 1.3 K/uL    Eosinophils Absolute 0.2 0.0 - 0.6 K/uL    Basophils Absolute 0.1 0.0 - 0.2 K/uL   Comprehensive Metabolic Panel w/ Reflex to MG   Result Value Ref Range    Sodium 135 (L) 136 - 145 mmol/L    Potassium reflex Magnesium 4.2 3.5 - 5.1 mmol/L    Chloride 100 99 - 110 mmol/L    CO2 29 21 - 32 mmol/L    Anion Gap 6 3 - 16    Glucose 80 70 - 99 mg/dL    BUN 18 7 - 20 mg/dL    CREATININE 0.8 (L) 0.9 - 1.3 mg/dL    GFR Non-African American >60 >60    GFR African American >60 >60    Calcium 9.7 8.3 - 10.6 mg/dL    Total Protein 6.8 6.4 - 8.2 g/dL    Alb 4.5 3.4 - 5.0 g/dL    Albumin/Globulin Ratio 2.0 1.1 - 2.2    Total Bilirubin 0.3 0.0 - 1.0 mg/dL    Alkaline Phosphatase 96 40 - 129 U/L    ALT 25 10 - 40 U/L    AST 34 15 - 37 U/L    Globulin 2.3 g/dL   Lipase   Result Value Ref Range    Lipase 44.0 13.0 - 60.0 U/L   Urinalysis Reflex to Culture    Specimen: Urine, clean catch   Result Value Ref Range    Color, UA Yellow Straw/Yellow    Clarity, UA Clear Clear    Glucose, Ur Negative Negative mg/dL    Bilirubin Urine Negative Negative    Ketones, Urine Negative Negative mg/dL    Specific Gravity, UA >=1.030 1.005 - 1.030    Blood, Urine Negative Negative    pH, UA 6.0 5.0 - 8.0    Protein, UA Negative Negative mg/dL    Urobilinogen, Urine 0.2 <2.0 E.U./dL    Nitrite, Urine Negative Negative    Leukocyte Esterase, Urine Negative Negative    Microscopic Examination Not Indicated     Urine Type NotGiven     Urine Reflex to Culture Not Indicated          I estimate there is LOW risk for ACUTE APPENDICITIS, BOWEL OBSTRUCTION, CHOLECYSTITIS, DIVERTICULITIS, INCARCERATED HERNIA, PANCREATITIS, or PERFORATED BOWEL or ULCER, thus I consider the discharge disposition reasonable. Also, there is no evidence or peritonitis, sepsis, or toxicity. [address] [place] [address]  and I have discussed the diagnosis and risks, and we agree with discharging home to follow-up with their primary doctor. We also discussed returning to the Emergency Department immediately if new or worsening symptoms occur. We have discussed the symptoms which are most concerning (e.g., bloody stool, fever, changing or worsening pain, vomiting) that necessitate immediate return. FINAL IMPRESSION      1. Colitis    2.  Nausea and vomiting, intractability of vomiting not specified, unspecified vomiting type          DISPOSITION/PLAN   DISPOSITION Decision To Discharge 10/26/2020 09:43:53 PM      PATIENT REFERRED TO:  [address] Bob Carlos MD  [address] [place] [address]  [phone]            DISCHARGE MEDICATIONS:  Discharge Medication List as of 10/26/2020  9:53 PM      START taking these medications    Details   metroNIDAZOLE (FLAGYL) 500 MG tablet Take 1 tablet by mouth 2 times daily for 10 days, Disp-20 tablet,R-0Print      ciprofloxacin (CIPRO) 500 MG tablet Take 1 tablet by mouth 2 times daily for 7 days, Disp-14 tablet,R-0Print      pantoprazole (PROTONIX) 40 MG tablet Take 1 tablet by mouth 2 times daily (before meals), Disp-60 tablet,R-0Print             DISCONTINUED MEDICATIONS:  Discharge Medication List as of 10/26/2020  9:53 PM      STOP taking these medications       sertraline (ZOLOFT) 50 MG tablet Comments:   Reason for Stopping:         vitamin D (ERGOCALCIFEROL) 1.25 MG (02451 UT) CAPS capsule Comments:   Reason for Stopping:                      (Please note that portions of this note were completed with a voice recognition program.  Efforts were made to edit the dictations but occasionally words are mis-transcribed.)    Shubham Sherwood PA-C (electronically signed)         Shubham Sherwood PA-C  10/27/20 8650

## 2020-10-28 ENCOUNTER — OFFICE VISIT (OUTPATIENT)
Dept: INTERNAL MEDICINE CLINIC | Age: 35
End: 2020-10-28
Payer: COMMERCIAL

## 2020-10-28 VITALS
HEART RATE: 76 BPM | SYSTOLIC BLOOD PRESSURE: 124 MMHG | WEIGHT: 173 LBS | OXYGEN SATURATION: 98 % | BODY MASS INDEX: 27.15 KG/M2 | TEMPERATURE: 98 F | HEIGHT: 67 IN | DIASTOLIC BLOOD PRESSURE: 72 MMHG

## 2020-10-28 PROCEDURE — G8482 FLU IMMUNIZE ORDER/ADMIN: HCPCS | Performed by: INTERNAL MEDICINE

## 2020-10-28 PROCEDURE — G8427 DOCREV CUR MEDS BY ELIG CLIN: HCPCS | Performed by: INTERNAL MEDICINE

## 2020-10-28 PROCEDURE — 4004F PT TOBACCO SCREEN RCVD TLK: CPT | Performed by: INTERNAL MEDICINE

## 2020-10-28 PROCEDURE — 90471 IMMUNIZATION ADMIN: CPT | Performed by: INTERNAL MEDICINE

## 2020-10-28 PROCEDURE — 99395 PREV VISIT EST AGE 18-39: CPT | Performed by: INTERNAL MEDICINE

## 2020-10-28 PROCEDURE — G8419 CALC BMI OUT NRM PARAM NOF/U: HCPCS | Performed by: INTERNAL MEDICINE

## 2020-10-28 PROCEDURE — 90686 IIV4 VACC NO PRSV 0.5 ML IM: CPT | Performed by: INTERNAL MEDICINE

## 2020-10-28 PROCEDURE — 99213 OFFICE O/P EST LOW 20 MIN: CPT | Performed by: INTERNAL MEDICINE

## 2020-10-28 NOTE — PATIENT INSTRUCTIONS
Preventive plan of care for Gay Rogers        10/28/2020           Preventive Measures Status       Recommendations for screening       Colon Cancer Screen  Colonoscopy Test recommended and referral provided   Diabetes Screen  Glucose (mg/dL)   Date Value   10/26/2020 80    Repeat yearly   Cholesterol Screen  Lab Results   Component Value Date    CHOL 139 10/22/2019    TRIG 90 10/22/2019    HDL 43 10/22/2019    LDLCALC 78 10/22/2019    Repeat yearly       Weight: Body mass index is 27.1 kg/m².   5' 7\" (1.702 m)173 lb (78.5 kg)  Your BMI is 25 or greater, which indicates that you are overweight        Recommended Immunizations    Immunization History   Administered Date(s) Administered    Influenza Virus Vaccine 09/01/2017    Influenza, Dulcy Cocks, IM, (6 mo and older Fluzone, Flulaval, Fluarix and 3 yrs and older Afluria) 10/22/2019    Influenza, Quadv, IM, PF (6 mo and older Fluzone, Flulaval, Fluarix, and 3 yrs and older Afluria) 10/05/2016    Pneumococcal Polysaccharide ([id number]) 10/22/2019    Td, unspecified formulation 05/03/2018    Tdap (Boostrix, Adacel) 10/05/2016    Influenza vaccine:  recommended every fall, administered today, risks and benefits discussed         Other Recommendations ·   See a dentist every 6 months  · Try to get at least 30 minutes of exercise 3-5 days per week  · Always wear a seat belt when traveling in a car  · Always wear a helmet when riding a bicycle or motorcycle  · When exposed to the sun, use a sunscreen that protects against both UVA and UVB radiation with an SPF of 30 or greater- reapply every 2 to 3 hours or after sweating, drying off with a towel, or swimming

## 2020-10-28 NOTE — PROGRESS NOTES
[facility]  History and Physical  Cayla Rosario M.D. Killian Jessica  YOB: 1985    Date of Service:  10/28/2020    Chief Complaint:   Killian Jessica is a 28 y.o. male who presents for   Chief Complaint   Patient presents with    ED Follow-up     colitis       HPI: Here for Annual Physical and Follow up. He was drinking a lot of alcohol 4 days ago and woke up vomitting and saw some blackish emesis with severe GERD. When he used the restroom, his stool was diarrhea like and coffee ground color. Abd pain has resolve and stool is bluish b/c he had some blue gatoraide. He went to the ER and had CT abd witch showed some colitis. GERD:  will start on Protonix 40 mg bid. Dermatitis:  Still continues to have irritation/itching around waiste where from his jeans rubbing and working in a hot environment. He has not been moisturizing after showers and was using steroid cream daily after showers.   Anxiety: stable off zoloft due to ED     Lab Results   Component Value Date    LABMICR Not Indicated 10/26/2020     Lab Results   Component Value Date     (L) 10/26/2020    K 4.2 10/26/2020     10/26/2020    CO2 29 10/26/2020    BUN 18 10/26/2020    CREATININE 0.8 (L) 10/26/2020    GLUCOSE 80 10/26/2020    CALCIUM 9.7 10/26/2020     Lab Results   Component Value Date    CHOL 139 10/22/2019    TRIG 90 10/22/2019    HDL 43 10/22/2019    LDLCALC 78 10/22/2019     Lab Results   Component Value Date    ALT 25 10/26/2020    AST 34 10/26/2020     Lab Results   Component Value Date    TSH 3.30 10/16/2017    T4FREE 1.2 10/16/2017     Lab Results   Component Value Date    WBC 8.1 10/26/2020    HGB 13.4 (L) 10/26/2020    HCT 38.9 (L) 10/26/2020    MCV 89.6 10/26/2020     10/26/2020     No results found for: INR   No results found for: PSA   No results found for: LABURIC     Wt Readings from Last 3 Encounters:   10/28/20 173 lb (78.5 kg)   10/26/20 165 lb (74.8 kg)   06/10/20 165 lb (74.8 kg)     BP Readings from Last 3 Encounters:   10/28/20 124/72   10/26/20 115/82   06/10/20 107/60       Patient Active Problem List   Diagnosis    Adjustment disorder with anxiety    ADD (attention deficit disorder)    Controlled substance agreement signed    Vitamin D deficiency    Irritable bowel syndrome with diarrhea       Allergies   Allergen Reactions    Amoxicillin Rash     Outpatient Medications Marked as Taking for the 10/28/20 encounter (Office Visit) with Sherry Srinivasan MD   Medication Sig Dispense Refill    metroNIDAZOLE (FLAGYL) 500 MG tablet Take 1 tablet by mouth 2 times daily for 10 days 20 tablet 0    ciprofloxacin (CIPRO) 500 MG tablet Take 1 tablet by mouth 2 times daily for 7 days 14 tablet 0    pantoprazole (PROTONIX) 40 MG tablet Take 1 tablet by mouth 2 times daily (before meals) 60 tablet 0       Past Medical History:   Diagnosis Date    ADD (attention deficit disorder) 11/17/2016    GERD (gastroesophageal reflux disease)     Irritable bowel syndrome with diarrhea 2/20/2020     Past Surgical History:   Procedure Laterality Date    NASAL SEPTUM SURGERY  2013     Family History   Problem Relation Age of Onset    No Known Problems Mother     Heart Disease Father         2 heart attack     Heart Attack Father 36    No Known Problems Sister     No Known Problems Brother     Cancer Maternal Grandfather         colon cancer     Diabetes Paternal Grandmother     Cancer Paternal Grandmother         cervical cancer      Social History     Socioeconomic History    Marital status: Single     Spouse name: Not on file    Number of children: Not on file    Years of education: Not on file    Highest education level: Not on file   Occupational History    Not on file   Social Needs    Financial resource strain: Not on file    Food insecurity     Worry: Not on file     Inability: Not on file    Transportation needs     Medical: Not on file     Non-medical: Not on file   Tobacco Use    Smoking status: Current Every Day Smoker     Packs/day: 1.00     Years: 10.00     Pack years: 10.00     Types: Cigarettes     Last attempt to quit: 2019     Years since quittin.2    Smokeless tobacco: Never Used   Substance and Sexual Activity    Alcohol use: Yes     Comment: occasionally    Drug use: No    Sexual activity: Yes     Partners: Female   Lifestyle    Physical activity     Days per week: Not on file     Minutes per session: Not on file    Stress: Not on file   Relationships    Social connections     Talks on phone: Not on file     Gets together: Not on file     Attends Jain service: Not on file     Active member of club or organization: Not on file     Attends meetings of clubs or organizations: Not on file     Relationship status: Not on file    Intimate partner violence     Fear of current or ex partner: Not on file     Emotionally abused: Not on file     Physically abused: Not on file     Forced sexual activity: Not on file   Other Topics Concern    Not on file   Social History Narrative    Not on file       Review of Systems:  A comprehensive review of systems was negative except for what was noted in the HPI. Physical Exam:   Vitals:    10/28/20 1147   BP: 124/72   Pulse: 76   Temp: 98 °F (36.7 °C)   TempSrc: Infrared   SpO2: 98%   Weight: 173 lb (78.5 kg)   Height: 5' 7\" (1.702 m)     Body mass index is 27.1 kg/m². Constitutional: He is oriented to person, place, and time. He appears well-developed and well-nourished. No distress. HEENT:   Head: Normocephalic and atraumatic. Right Ear: Tympanic membrane, external ear and ear canal normal.   Left Ear: Tympanic membrane, external ear and ear canal normal.   Mouth/Throat: Oropharynx is clear and moist and mucous membranes are normal. No oropharyngeal exudate or posterior oropharyngeal erythema. He has no cervical adenopathy.    Eyes: Conjunctivae and extraocular motions are normal. Pupils are equal, round, and reactive to light. Neck:  Supple. No JVD present. Carotid bruit is not present. No mass and no thyromegaly present. Cardiovascular: Normal rate, regular rhythm, normal heart sounds and intact distal pulses. Exam reveals no gallop and no friction rub. No murmur heard. Pulmonary/Chest: Effort normal and breath sounds normal. No respiratory distress. He has no wheezes, rhonchi or rales. Abdominal: Soft, non-tender. Bowel sounds and aorta are normal. There is no organomegaly, mass or bruit. Musculoskeletal: Normal range of motion, no synovitis. He exhibits no edema. Neurological: He is alert and oriented to person, place, and time. He has normal reflexes. No cranial nerve deficit. Coordination normal.   Skin: Skin is warm, dry and intact. No suspicious lesions are noted. Psychiatric: He has a normal mood and affect. His speech is normal and behavior is normal. Judgment, cognition and memory are normal.   Excoriated marks with mild rough skin, not appear inflamed currently around his waist area where he hannah his jeans.     Preventive Care:  Health Maintenance   Topic Date Due    Flu vaccine (1) 09/01/2020    DTaP/Tdap/Td vaccine (3 - Td) 05/03/2028    Pneumococcal 0-64 years Vaccine  Completed    HIV screen  Completed    Hepatitis A vaccine  Aged Out    Hepatitis B vaccine  Aged Out    Hib vaccine  Aged Out    Meningococcal (ACWY) vaccine  Aged Out    Varicella vaccine  Discontinued        Sexual activity: has sex with females   Last eye exam: 5 years ago, normal  Exercise: walks 5 time(s) per week         Preventive plan of care for Nelli Adair        10/28/2020           Preventive Measures Status       Recommendations for screening       Colon Cancer Screen  Colonoscopy Test recommended and referral provided   Diabetes Screen  Glucose (mg/dL)   Date Value   10/26/2020 80    Repeat yearly   Cholesterol Screen  Lab Results   Component Value Date    CHOL 139 10/22/2019    TRIG 90 10/22/2019    HDL 43 10/22/2019    LDLCALC 78 10/22/2019    Repeat yearly       Weight: Body mass index is 27.1 kg/m². 5' 7\" (1.702 m)173 lb (78.5 kg)  Your BMI is 25 or greater, which indicates that you are overweight        Recommended Immunizations    Immunization History   Administered Date(s) Administered    Influenza Virus Vaccine 09/01/2017    Influenza, Karen Arslan, IM, (6 mo and older Fluzone, Flulaval, Fluarix and 3 yrs and older Afluria) 10/22/2019    Influenza, Quadv, IM, PF (6 mo and older Fluzone, Flulaval, Fluarix, and 3 yrs and older Afluria) 10/05/2016    Pneumococcal Polysaccharide ([id number]) 10/22/2019    Td, unspecified formulation 05/03/2018    Tdap (Boostrix, Adacel) 10/05/2016    Influenza vaccine:  recommended every fall, administered today, risks and benefits discussed         Other Recommendations ·   See a dentist every 6 months  · Try to get at least 30 minutes of exercise 3-5 days per week  · Always wear a seat belt when traveling in a car  · Always wear a helmet when riding a bicycle or motorcycle  · When exposed to the sun, use a sunscreen that protects against both UVA and UVB radiation with an SPF of 30 or greater- reapply every 2 to 3 hours or after sweating, drying off with a towel, or swimming             Assessment/Plan:  Robbi Busch was seen today for ed follow-up and annual exam.    Diagnoses and all orders for this visit:    Annual physical exam    Need for influenza vaccination  -     INFLUENZA, QUADV, 3 YRS AND OLDER, IM PF, PREFILL SYR OR SDV, 0.5ML (AFLURIA QUADV, PF)    Dermatitis  -     triamcinolone (KENALOG) 0.1 % ointment; Apply topically 2 times daily    Colitis  -     [facility]        Return Fasting Physical in 1 year. impaired postural control/impaired balance/decreased flexibility/impaired coordination

## 2020-10-29 ENCOUNTER — INITIAL CONSULT (OUTPATIENT)
Dept: GASTROENTEROLOGY | Age: 35
End: 2020-10-29
Payer: COMMERCIAL

## 2020-10-29 VITALS
SYSTOLIC BLOOD PRESSURE: 147 MMHG | HEART RATE: 82 BPM | HEIGHT: 68 IN | TEMPERATURE: 97.7 F | BODY MASS INDEX: 26.46 KG/M2 | DIASTOLIC BLOOD PRESSURE: 92 MMHG | WEIGHT: 174.6 LBS

## 2020-10-29 PROCEDURE — G8419 CALC BMI OUT NRM PARAM NOF/U: HCPCS | Performed by: INTERNAL MEDICINE

## 2020-10-29 PROCEDURE — 99204 OFFICE O/P NEW MOD 45 MIN: CPT | Performed by: INTERNAL MEDICINE

## 2020-10-29 PROCEDURE — 4004F PT TOBACCO SCREEN RCVD TLK: CPT | Performed by: INTERNAL MEDICINE

## 2020-10-29 PROCEDURE — G8427 DOCREV CUR MEDS BY ELIG CLIN: HCPCS | Performed by: INTERNAL MEDICINE

## 2020-10-29 PROCEDURE — G8482 FLU IMMUNIZE ORDER/ADMIN: HCPCS | Performed by: INTERNAL MEDICINE

## 2020-10-29 RX ORDER — POLYETHYLENE GLYCOL 3350 17 G/17G
238 POWDER ORAL DAILY
Qty: 255 G | Refills: 0 | Status: SHIPPED | OUTPATIENT
Start: 2020-10-29 | End: 2021-11-11

## 2020-10-29 NOTE — PROGRESS NOTES
[id number] [facility],  [address] [facility] [place], [address] [place] [address]  Phone: [phone]   ZCV:[phone]    CHIEF COMPLAINT     Chief Complaint   Patient presents with    New Patient     colitis, bacterial infection in colon, ulcers?  acid reflux, feels like food gets stuck in chest, spit up tarlike substance, black tar stool and stool like coffee grounds. HPI (location/symptom, timing/onset, duration, quality/severity, context, modifying factors, and associated signs/symptoms)     Thank you ROSA JURADO MD for asking me to see Nina Mancia in consultation. He is a Single [1] White [1] 28 y.o. . male seen independently who presents with the following GI complaints:    Yessica Thomson  Had coffee ground emesis after drinking (modifying factor) Saturday with associated melena. Associated - Has had heartburn as long as he can remember and has chronic mixed dysphagia. Also has intermittent chronic diarrhea. No pertinent GI family history. Modifying factors - greasy food. Was in ER for this 2 days ago and CT showed colitis and blood work a mild acute anemia.     Lab Results   Component Value Date    WBC 8.1 10/26/2020    HGB 13.4 (L) 10/26/2020    HCT 38.9 (L) 10/26/2020    MCV 89.6 10/26/2020     10/26/2020     Lab Results   Component Value Date     (L) 10/26/2020    K 4.2 10/26/2020     10/26/2020    CO2 29 10/26/2020    BUN 18 10/26/2020    CREATININE 0.8 (L) 10/26/2020    GLUCOSE 80 10/26/2020    CALCIUM 9.7 10/26/2020    PROT 6.8 10/26/2020    LABALBU 4.5 10/26/2020    BILITOT 0.3 10/26/2020    ALKPHOS 96 10/26/2020    AST 34 10/26/2020    ALT 25 10/26/2020    LABGLOM >60 10/26/2020    GFRAA >60 10/26/2020    AGRATIO 2.0 10/26/2020    GLOB 2.3 10/26/2020       Lab Results   Component Value Date    LIPASE 44.0 10/26/2020       CT ABDOMEN PELVIS W IV CONTRAST Additional Contrast? None  Narrative: EXAMINATION:  CT OF THE ABDOMEN AND PELVIS WITH CONTRAST 10/26/2020 8:11 pm    TECHNIQUE:  CT of the abdomen and pelvis was performed with the administration of  intravenous contrast. Multiplanar reformatted images are provided for review. Dose modulation, iterative reconstruction, and/or weight based adjustment of  the mA/kV was utilized to reduce the radiation dose to as low as reasonably  achievable. COMPARISON:  None. HISTORY:  ORDERING SYSTEM PROVIDED HISTORY: abd pain, concern for gi bleed  TECHNOLOGIST PROVIDED HISTORY:  Reason for exam:->abd pain, concern for gi bleed  Additional Contrast?->None  Reason for Exam: Emesis (coffee colored looking emesis all day yesterday)    FINDINGS:  Lower Chest: No acute infiltrate at the lung bases. Moderate-sized hiatal  hernia. Organs: The liver, spleen, pancreas and adrenal glands are unremarkable. The  gallbladder is contracted with no biliary dilatation. No renal mass or  significant hydronephrosis. GI/Bowel: There is mild colonic wall thickening in the mid sigmoid colon,  likely accentuated by incomplete distention but possibly a mild colitis. No  significant pericolonic inflammatory changes. The appendix is unremarkable. No small bowel distension. The distal stomach and duodenal sweep are  unremarkable. Pelvis: No pelvic mass or free pelvic fluid. The prostate is not enlarged. Partial distention of the urinary bladder. Mild bladder wall thickening is  likely accentuated by incomplete distention. Peritoneum/Retroperitoneum: The abdominal aorta is normal in caliber. No  retroperitoneal adenopathy or upper abdominal ascites. Bones/Soft Tissues: No acute osseous or soft tissue abnormality. Impression: 1. Questionable mild thickening of the mid sigmoid colon, likely accentuated  by incomplete distention. The possibility of a mild colitis is raised. 2. Otherwise unremarkable CT of the abdomen and pelvis. No evidence of  appendicitis. 3. Moderate-sized hiatal hernia.     Last Encounter Reviewed:  Pertinent PMH, FH, SH is reviewed below. Last EGD: none  Last Colonoscopy: none    Review of available records reveals:   Wt Readings from Last 50 Encounters:   10/29/20 174 lb 9.6 oz (79.2 kg)   10/28/20 173 lb (78.5 kg)   10/26/20 165 lb (74.8 kg)   06/10/20 165 lb (74.8 kg)   03/23/20 158 lb (71.7 kg)   02/20/20 151 lb (68.5 kg)   10/22/19 162 lb (73.5 kg)   05/03/18 165 lb (74.8 kg)   10/16/17 161 lb (73 kg)   10/15/17 165 lb (74.8 kg)   04/13/17 170 lb (77.1 kg)   11/17/16 158 lb (71.7 kg)   10/05/16 161 lb (73 kg)   08/05/16 160 lb (72.6 kg)   02/27/16 170 lb (77.1 kg)   10/20/11 165 lb (74.8 kg)       No components found for: HGBA1C  BP Readings from Last 3 Encounters:   10/29/20 (!) 147/92   10/28/20 124/72   10/26/20 115/82     Health Maintenance   Topic Date Due    DTaP/Tdap/Td vaccine (3 - Td) 05/03/2028    Flu vaccine  Completed    Pneumococcal 0-64 years Vaccine  Completed    HIV screen  Completed    Hepatitis A vaccine  Aged Out    Hepatitis B vaccine  Aged Out    Hib vaccine  Aged Out    Meningococcal (ACWY) vaccine  Aged Out    Varicella vaccine  Discontinued       No components found for: Nanostellar     PAST MEDICAL HISTORY     Past Medical History:   Diagnosis Date    ADD (attention deficit disorder) 11/17/2016    GERD (gastroesophageal reflux disease)     Irritable bowel syndrome with diarrhea 2/20/2020     FAMILY HISTORY     Family History   Problem Relation Age of Onset    No Known Problems Mother     Heart Disease Father         2 heart attack     Heart Attack Father 36    No Known Problems Sister     No Known Problems Brother     Cancer Maternal Grandfather         colon cancer     Diabetes Paternal Grandmother     Cancer Paternal Grandmother         cervical cancer      SOCIAL HISTORY     Social History     Socioeconomic History    Marital status: Single     Spouse name: Not on file    Number of children: Not on file    Years of education: Not on file   [facility] 2 times daily for 10 days 20 tablet 0    ciprofloxacin (CIPRO) 500 MG tablet Take 1 tablet by mouth 2 times daily for 7 days 14 tablet 0    pantoprazole (PROTONIX) 40 MG tablet Take 1 tablet by mouth 2 times daily (before meals) 60 tablet 0     ALLERGIES     Allergies   Allergen Reactions    Amoxicillin Rash     IMMUNIZATIONS     Immunization History   Administered Date(s) Administered    Influenza Virus Vaccine 09/01/2017    Influenza, Jennifer Guise, IM, (6 mo and older Fluzone, Flulaval, Fluarix and 3 yrs and older Afluria) 10/22/2019    Influenza, Quadv, IM, PF (6 mo and older Fluzone, Flulaval, Fluarix, and 3 yrs and older Afluria) 10/05/2016, 10/28/2020    Pneumococcal Polysaccharide ([id number]) 10/22/2019    Td, unspecified formulation 05/03/2018    Tdap (Boostrix, Adacel) 10/05/2016     REVIEW OF SYSTEMS (2-9 systems for level 4, 10 or more for level 5)   See HPI for further details and pertinent postiives. Negative for the following:  Constitutional: Negative for weight change. Negative for appetite change and fatigue. HENT: Negative for nosebleeds, sore throat, mouth sores, and voice change. Respiratory: Negative for cough, choking and chest tightness. Cardiovascular: Negative for chest pain   Gastrointestinal:  No abdominal pain, heartburn, bloating, dysphagia, cough, chest pain, globus, regurgitation, diarrhea, constipation, nausea, or vomiting. Positive for blood . Musculoskeletal: Negative for arthralgias. Skin: Negative for pallor. Neurological: Negative for weakness and light-headedness. Hematological: Negative for adenopathy. Does not bruise/bleed easily. Psychiatric/Behavioral: Negative for suicidal ideas.    PHYSICAL EXAM (7 for level 4, 8 or more for level 5)   VITAL SIGNS: BP (!) 147/92 (Site: Left Wrist, Position: Sitting, Cuff Size: Medium Adult)   Pulse 82   Temp 97.7 °F (36.5 °C) (Temporal)   Ht 5' 8\" (1.727 m)   Wt 174 lb 9.6 oz (79.2 kg)   BMI 26.55 kg/m²   Wt Readings from Last 3 Encounters:   10/29/20 174 lb 9.6 oz (79.2 kg)   10/28/20 173 lb (78.5 kg)   10/26/20 165 lb (74.8 kg)     Constitutional: Well developed, Well nourished, No acute distress, Non-toxic appearance. HENT: Normocephalic, Atraumatic, Bilateral external ears normal, Oropharynx moist, No oral exudates, Nose normal.   Eyes: Conjunctiva normal, No discharge. Neck: Normal range of motion, No tenderness, Supple, No stridor. Lymphatic: No cervical, subclavian, or axillary lymphadenopathy. Cardiovascular: Normal heart rate, Normal rhythm, No murmurs, No rubs, No gallops. Thorax & Lungs: Normal breath sounds, No respiratory distress, No wheezing, No chest tenderness. No gynecomastia. Abdomen/GI: scars consistent with stated surgeries, no hernias, no HSM, soft NTND   Rectal:  Deferred. Skin: Warm, Dry, No erythema, No rash. No bruising. No spider hemangiomas. Back: No tenderness, No CVA tenderness. Lower Extremities: Intact distal pulses, No edema, No tenderness, No cyanosis, No clubbing. Neurologic: Alert & oriented x 3, Normal motor function, Normal sensory function, No focal deficits noted. No asterixis. RADIOLOGY/PROCEDURES       FINAL IMPRESSION     Orders Placed This Encounter   Procedures    COLONOSCOPY W/ OR W/O BIOPSY     Scheduling Instructions:      Schedule with anesthesia provided diprivan. Please provide prep of choice instructions and prescription. General guidelines for holding blood thinners/anticoagulants around endoscopic procedure are but patients are encouraged to check with their prescribing physician. The patient may hold Plavix, Effient, Brilinta 5 days prior to the procedure unless:       A drug eluting stent has been placed within past 12 months. A nondrug eluting stent has been placed within past 1 month.       Coumadin may be held 4 days prior to the procedure unless:        Mechanical mitral valve replacement (requires heparin bridge while Coumadin held and is managed by pharmacy)      Pradaxa, Xarelto, Eliquis may be held 2-3 days prior to procedure. According to pharmacokinetics of the drug, package insert, cardiology practice patterns, and T1/2 of theses drugs (12 hrs), Eliquis and Xarelto are held 48hrs prior to any procedure, including major surgical procedures w/o       increased bleeding.  That is usually the standard of care, as coagulation would/should be normalized at 48hrs. Every attempt should be made to maintain ASA 81mg per day throughout the lupillo-operative period in patients with diagnosis of ASHD. These recommendations may need to be modified by the provider/ based on risk /benefit analysis of the procedure and the patients history. If anticoagulation can not be held because recent cardiac stent, elective endoscopic procedures should be delayed until they have received the minimum duration of recommended antiplatlet therapy and it can safely be held. Again if unsure, patient should discuss with prescribing physician/service. If anticoagulation can not be stopped, endoscopic procedures can still be performed either diagnostically at a somewhat higher risk. Understand that any therapeutic procedure where anything beyond looking is performed, carries higher risks. For this reason without overt bleeding other testing       such as cologuard may be more appropriate. High risk endoscopic procedures that require stopping antiplatelet and anticoagulation therapy include polypectomy, biliary or pancreatic sphincterotomy, pneumatic or bougie dilation, PEG placement, therapeutic balloon-assisted enteroscopy, EUS and FNA, tumor ablation by any technique,       cystogastrostomy,and treatment of varices. Order Specific Question:   Screening or Diagnostic?      Answer:   Diagnostic    Covid-19 Ambulatory     Standing Status:   Future     Standing Expiration Date: 10/29/2021     Scheduling Instructions:      Saline media preferred given current shortage of viral transport media but both acceptable     Order Specific Question:   Status     Answer:   Asymptomatic/Surveillance (e.g. pre-op/pre-procedure, pre-delivery, transfer)     Order Specific Question:   Reason for Test     Answer:   Upcoming elective surgery/procedure/delivery, return to work, or discharge to another facility    EGD     Scheduling Instructions:      Schedule with anesthesia provided diprivan sedation. Please provide prep of choice instructions and prescription. General guidelines for holding blood thinners/anticoagulants around endoscopic procedure are but patients are encouraged to check with their prescribing physician. The patient may hold Plavix, Effient, Brilinta 5 days prior to the procedure unless:       A drug eluting stent has been placed within past 12 months. A nondrug eluting stent has been placed within past 1 month. Coumadin may be held 4 days prior to the procedure unless:        Mechanical mitral valve replacement (requires heparin bridge while Coumadin held and is managed by pharmacy)      Pradaxa, Xarelto, Eliquis may be held 2-3 days prior to procedure. According to pharmacokinetics of the drug, package insert, cardiology practice patterns, and T1/2 of theses drugs (12 hrs), Eliquis and Xarelto are held 48hrs prior to any procedure, including major surgical procedures w/o       increased bleeding.  That is usually the standard of care, as coagulation would/should be normalized at 48hrs. Every attempt should be made to maintain ASA 81mg per day throughout the lupillo-operative period in patients with diagnosis of ASHD. These recommendations may need to be modified by the provider/ based on risk /benefit analysis of the procedure and the patients history.             If anticoagulation can not be held because recent cardiac FOLLOWUP   Return for EGD & Colonoscopy.           Ish Quesadadina 10/29/20 3:54 PM EDT    CC:  Maria Eugenia Haywood MD

## 2020-10-29 NOTE — PATIENT INSTRUCTIONS
Tramaine Mendoza    [address] [place] [address] ,  677 [facility]  Phone: [phone] [facility] [address], [address] [facility], [facility]  Phone: [phone]    Sedation  Three types of sedation are used for endoscopy and colonoscopy. The standard and most common is called conscious sedation. This is administered by the gastroenterologist and is part of the standard procedure. Common medications used for this are IV forms of a benzodiazepine (most commonly Versed) and a narcotic (most commonly fentanyl). Benadryl and nausea medicines may also be used. The effect of this is to make you comfortable. Most people will actually have amnesia with this and not recall the procedure. Some individuals will have other types of anesthesia provided by an anesthesiologist or nurse anesthetist.  The reason for this is a history of poor sedation, medication use that makes one more resistant to conscious sedation, a medical condition for which conscious sedation is contraindicated or other medical unstable conditions. This usually involves a separate fee from anesthesia. The most common of these is propofol (diprivan sedation) which is a deeper sedative the conscious sedation. In some instances, general anesthesia with intubation (breathing tube) is required. If you need to cancel or reschedule, please do so at least 2 weeks before the procedure, so that we can be considerate to other patients who are waiting to be scheduled.     If you cancel or reschedule less than 7 days before your procedure, you will be placed on a lower priority list.    ENDOSCOPY OVERVIEW  An upper endoscopy, often referred to as endoscopy, EGD, or xopiahjo-nihgig-czebwftfvotp, is a procedure that allows a physician to directly examine the upper part of the gastrointestinal (GI) tract, which includes the esophagus (swallowing tube), the stomach, and the duodenum (the first section of the stop specific medications (such as aspirin-like drugs) temporarily before the examination. You should discuss your medications with your physician before your appointment for the endoscopy. You should arrange for a friend or family member to escort you home after the examination. Although you will be awake by the time you are discharged, the medications used for sedation cause temporary changes in the reflexes and judgment and interfere with your ability to drive or make decisions (similar to the effects of alcohol). WHAT TO EXPECT DURING ENDOSCOPY  Prior to the endoscopy, the staff will review your medical and surgical history, including current medications. A physician will explain the procedure and ask you to sign a consent. Before signing the consent, you should understand all the benefits and risks of the procedure, and should have all of your questions answered. An intravenous line (a needle inserted into a vein in the hand or arm) will be started to deliver medications. You will be given a combination of a sedative (to help you relax), and a narcotic (to prevent discomfort). Although most patients are sedated for the examination, many tolerate the procedure well without any medication. Your vital signs (blood pressure, heart rate, and blood oxygen level) will be monitored before, during, and after the examination. The monitoring is not painful. Oxygen is often given during the procedure through a small tube that sits under the nose and is fitted around the ears. For safety reasons, dentures should be removed before the procedure. THE ENDOSCOPY PROCEDURE  The procedure typically takes between 10 and 20 minutes to complete. The endoscopy is performed while you lie on your left side. Sometimes the physician will give a medication to numb the throat (either a gargle or a spray). A plastic mouth guard is placed between the teeth to prevent damage to the teeth and scope.   The endoscope (also called a gastroscope) is a flexible tube that is about the size of a finger. The scope has a lens and a light source that allows the endoscopist to look into the scope to see the inner lining of the upper gastrointestinal tract, or to view it on a TV monitor. Most people have no difficulty swallowing the flexible gastroscope as a result of the sedating medications. Many people sleep during the test; others are very relaxed and generally not aware of the examination. An alternative procedure called transnasal endoscopy may be available in some facilities. This involves passing a very thin scope (about the size of a drinking straw) through the nose. You are not sedated but a medication is applied to the nose to prevent discomfort. A full examination can be performed with this instrument. The endoscopist may take tissue samples called biopsies (not painful), or perform specific treatments (such as dilation, removal of polyps, treatment of bleeding), depending upon what is found during the examination. Air is introduced through the scope to open the esophagus, stomach, and intestine, allowing the scope to be passed through these structures and improving the endoscopist's ability to see all of the structures. You may experience a mild discomfort as air is pushed into the intestinal tract. This is not harmful and belching may relieve the sensation. The endoscope does not interfere with breathing. Taking slow, deep breaths during the procedure may help you to relax. ENDOSCOPY RECOVERY  After the endoscopy, you will be observed for one to two hours while the sedative medication wears off. The medicines cause most people to temporarily feel tired or have difficulty concentrating and you should not drive or return to work after the procedure. The most common discomfort after the examination is a feeling of bloating as a result of the air introduced during the examination. This usually resolves quickly.  Some patients also have a healthcare provider is the best source of information for questions and concerns related to your medical problem. The following organizations also provide reliable health information. [employer] (www.nlm.nih.gov/medlineplus/healthtopics. html)  The American Society of Gastrointestinal Endoscopy: (www.askasge. org)  [employer] of Diabetes and Digestive and Kidney Diseases (http://digestive. niddk.nih.gov/ddiseases/pubs/upperendoscopy/index. htm)  ______________________________________________________________________________________________________________________________________    COLONOSCOPY OVERVIEW  A colonoscopy is an exam of the lower part of the gastrointestinal tract, which is called the colon or large intestine (bowel). Colonoscopy is a safe procedure that provides information other tests may not be able to give. Patients who require colonoscopy often have questions and concerns about the procedure. Colonoscopy is performed by inserting a device called a colonoscope into the anus and advanced through the entire colon. The procedure generally takes between 20 minutes and one hour. Other tests that are sometimes used to screen for colon cancer, like virtual colonoscopy (also called CT colonography), are discussed separately. More detailed information about colonoscopy is available by subscription.     REASONS FOR COLONOSCOPY   The most common reasons for colonoscopy are to evaluate the following:        As a screening exam for colon cancer      Rectal bleeding      A change in bowel habits, like persistent diarrhea      Iron deficiency anemia (a decrease in blood count due to loss of iron)      A family history of colon cancer      As a follow-up test in people with colon polyps or colon cancer      Chronic, unexplained abdominal or rectal pain      An abnormal X-ray exam, like a barium enema or CT scan    COLONOSCOPY PREPARATION  Before colonoscopy, your colon must be completely cleaned out so that the doctor can see any abnormal areas. To clean the colon, you will take a strong laxative and empty your bowels the night before your test.    Your doctor's office will provide specific instructions about how you should prepare for colonoscopy. Be sure to read these instructions ahead of time so you will be prepared for the prep. If you have questions, call the doctor's office in advance. You will need to avoid solid food for at least one day before the test. You should also drink plenty of fluids on the day before the test. You can drink clear liquids up to several hours before your procedure, including:        Water      Clear broth (beef, chicken, or vegetable)      Coffee or tea (without milk)      Ices      Gelatin (avoid red gelatin)    The day or night before the colonoscopy, you will take a laxative in two parts:        A pill that you take by mouth      A powder that is mixed with water    The most common laxative treatment is called Go-Lytely® or Half-Lytely®. You can add a flavoring (included), which, unfortunately, only partially hides the unpleasant taste. Most doctors do not recommend that you add other flavorings to the solution. Refrigerating the solution can make it easier to drink. Drinking this solution may be the most unpleasant part of the exam. You will begin to have watery diarrhea within a short time after drinking the solution. If you become nauseated or vomit while drinking the solution, call your doctor or nurse for instructions. Medicines - You can take most prescription and nonprescription medicines right up to the day of the colonoscopy. Your doctor should tell you what medicines to stop. You should also tell the doctor if you are allergic to any medicines. Some medicines increase the risk of heavy bleeding if you have a biopsy during the colonoscopy.  Ask your doctor how and when to stop these medicines, including warfarin/Coumadin® and clopidogrel/Plavix®. Transportation home - You will be given a sedative (a medicine to help you relax) during the colonoscopy, so you will need someone to take you home after your test. Although you will be awake by the time you go home, the sedative medicines cause changes in reflexes and judgment that can interfere with your ability to make decisions, similar to the effect of alcohol. WHAT TO EXPECT  Before the test, a doctor will review the test, including possible complications, and will ask you to sign a consent form. The nurse will start an IV line in your hand or arm. Your blood pressure and heart rate will be monitored during the test.    THE COLONOSCOPY PROCEDURE  You will be given fluid and medicines through an IV line. Many people sleep during the test, while others are very relaxed, comfortable, and generally not aware. The colonoscope is a flexible tube, approximately the size of the index finger. The scope pumps air into the colon to inflate it and allow the doctor to see the entire lining. You might feel bloating or gas cramps as the air opens the colon. Try not to be embarrassed about passing this gas, and let your doctor know if you are uncomfortable. During the procedure, the doctor might take a biopsy (small pieces of tissue) or remove polyps. Polyps are growths of tissue that can range in size from the tip of a pen to several inches. Most polyps are benign (not cancerous). However, some polyps can become cancerous if allowed to grow for a long time. Having a polyp removed does not hurt. RECOVERY FROM COLONOSCOPY  After the colonoscopy, you will be observed in a recovery area until the effects of the sedative medication wear off. The most common complaint after colonoscopy is a feeling of bloating and gas cramps. You may also feel groggy from the sedation medications. You should not return to work or drive that day.  Most people are able to eat normally after the test. Ask your

## 2020-10-29 NOTE — LETTER
[address] ,  [address] [facility]  Phone: [phone]  [facility]2 [facility],  [address] [place] [address], [address] [place] [address]  Phone: [phone]   XGR:[phone]    10/29/20    Patient:Bobby Thomson  MR Number:<[id number]>  YOB: 1985  Date of Visit:10/29/20    Dear Dr. Eliu Ellis MD    Thank you for the request for consultation for Anita Mcelroy to me for the evaluation of   Chief Complaint   Patient presents with    New Patient     colitis, bacterial infection in colon, ulcers?  acid reflux, feels like food gets stuck in chest, spit up tarlike substance, black tar stool and stool like coffee grounds. . Below are the relevant portions of my assessment and plan of care. FINAL DIAGNOSIS/Assessment   Diagnosis Orders   1. Hematemesis, presence of nausea not specified  EGD   2. Preop testing  Covid-19 Ambulatory   3. Abnormal finding on GI tract imaging  COLONOSCOPY W/ OR W/O BIOPSY    bisacodyl (DULCOLAX) 5 MG EC tablet    polyethylene glycol (MIRALAX) 17 GM/SCOOP POWD powder   4. Diarrhea, unspecified type  COLONOSCOPY W/ OR W/O BIOPSY    bisacodyl (DULCOLAX) 5 MG EC tablet    polyethylene glycol (MIRALAX) 17 GM/SCOOP POWD powder   5. Heartburn  EGD       VISIT ORDERS/Plan  Orders Placed This Encounter   Procedures    COLONOSCOPY W/ OR W/O BIOPSY     Scheduling Instructions:      Schedule with anesthesia provided diprivan. Please provide prep of choice instructions and prescription. General guidelines for holding blood thinners/anticoagulants around endoscopic procedure are but patients are encouraged to check with their prescribing physician. The patient may hold Plavix, Effient, Brilinta 5 days prior to the procedure unless:       A drug eluting stent has been placed within past 12 months. A nondrug eluting stent has been placed within past 1 month. Coumadin may be held 4 days prior to the procedure unless:        Mechanical mitral valve replacement (requires heparin bridge while Coumadin held and is managed by pharmacy)      Pradaxa, Xarelto, Eliquis may be held 2-3 days prior to procedure. According to pharmacokinetics of the drug, package insert, cardiology practice patterns, and T1/2 of theses drugs (12 hrs), Eliquis and Xarelto are held 48hrs prior to any procedure, including major surgical procedures w/o       increased bleeding.  That is usually the standard of care, as coagulation would/should be normalized at 48hrs. Every attempt should be made to maintain ASA 81mg per day throughout the lupillo-operative period in patients with diagnosis of ASHD. These recommendations may need to be modified by the provider/ based on risk /benefit analysis of the procedure and the patients history. If anticoagulation can not be held because recent cardiac stent, elective endoscopic procedures should be delayed until they have received the minimum duration of recommended antiplatlet therapy and it can safely be held. Again if unsure, patient should discuss with prescribing physician/service. If anticoagulation can not be stopped, endoscopic procedures can still be performed either diagnostically at a somewhat higher risk. Understand that any therapeutic procedure where anything beyond looking is performed, carries higher risks. For this reason without overt bleeding other testing       such as cologuard may be more appropriate. High risk endoscopic procedures that require stopping antiplatelet and anticoagulation therapy include polypectomy, biliary or pancreatic sphincterotomy, pneumatic or bougie dilation, PEG placement, therapeutic balloon-assisted enteroscopy, EUS and FNA, tumor ablation by any technique,       cystogastrostomy,and treatment of varices. Order Specific Question:   Screening or Diagnostic? Answer:   Diagnostic    Covid-19 Ambulatory     Standing Status:   Future     Standing Expiration Date:   10/29/2021     Scheduling Instructions:      Saline media preferred given current shortage of viral transport media but both acceptable     Order Specific Question:   Status     Answer:   Asymptomatic/Surveillance (e.g. pre-op/pre-procedure, pre-delivery, transfer)     Order Specific Question:   Reason for Test     Answer:   Upcoming elective surgery/procedure/delivery, return to work, or discharge to another facility    EGD     Scheduling Instructions:      Schedule with anesthesia provided diprivan sedation. Please provide prep of choice instructions and prescription. General guidelines for holding blood thinners/anticoagulants around endoscopic procedure are but patients are encouraged to check with their prescribing physician. The patient may hold Plavix, Effient, Brilinta 5 days prior to the procedure unless:       A drug eluting stent has been placed within past 12 months. A nondrug eluting stent has been placed within past 1 month. Coumadin may be held 4 days prior to the procedure unless:        Mechanical mitral valve replacement (requires heparin bridge while Coumadin held and is managed by pharmacy)      Pradaxa, Xarelto, Eliquis may be held 2-3 days prior to procedure. According to pharmacokinetics of the drug, package insert, cardiology practice patterns, and T1/2 of theses drugs (12 hrs), Eliquis and Xarelto are held 48hrs prior to any procedure, including major surgical procedures w/o       increased bleeding.  That is usually the standard of care, as coagulation would/should be normalized at 48hrs. Every attempt should be made to maintain ASA 81mg per day throughout the lupillo-operative period in patients with diagnosis of ASHD. These recommendations may need to be modified by the provider/ based on risk /benefit analysis of the procedure and the patients history. If anticoagulation can not be held because recent cardiac stent, elective endoscopic procedures should be delayed until they have received the minimum duration of recommended antiplatlet therapy and it can safely be held. Again if unsure, patient should discuss with prescribing physician/service. If anticoagulation can not be stopped, endoscopic procedures can still be performed either diagnostically at a somewhat higher risk. Understand that any therapeutic procedure where anything beyond looking is performed, carries higher risks. For this reason without overt bleeding other testing       such as cologuard may be more appropriate. High risk endoscopic procedures that require stopping antiplatelet and anticoagulation therapy include polypectomy, biliary or pancreatic sphincterotomy, pneumatic or bougie dilation, PEG placement, therapeutic balloon-assisted enteroscopy, EUS and FNA, tumor ablation by any technique,       cystogastrostomy,and treatment of varices. Order Specific Question:   Screening or Diagnostic? Answer:   Diagnostic       If you have questions, please do not hesitate to call me. I look forward to following [address] along with you.     Sincerely,        Stephanie Daly 10/29/20 4:04 PM EDT

## 2020-11-07 ENCOUNTER — HOSPITAL ENCOUNTER (OUTPATIENT)
Age: 35
Discharge: HOME | End: 2020-11-07
Payer: COMMERCIAL

## 2020-11-07 PROCEDURE — U0003 INFECTIOUS AGENT DETECTION BY NUCLEIC ACID (DNA OR RNA); SEVERE ACUTE RESPIRATORY SYNDROME CORONAVIRUS 2 (SARS-COV-2) (CORONAVIRUS DISEASE [COVID-19]), AMPLIFIED PROBE TECHNIQUE, MAKING USE OF HIGH THROUGHPUT TECHNOLOGIES AS DESCRIBED BY CMS-2020-01-R: HCPCS

## 2020-11-09 LAB — SARS-COV-2, PCR: NOT DETECTED

## 2020-11-09 NOTE — PROGRESS NOTES
Preoperative Screening for Elective Surgery/Invasive Procedures While COVID-19 present in the community     Have you tested positive or have been told to self-isolate for COVID-19 like symptoms within the past 28 days? No   Do you currently have any of the following symptoms? No  o Fever >100.0 F or 99.9 F in immunocompromised patients? No  o New onset cough, shortness of breath or difficulty breathing? No  o New onset sore throat, myalgia (muscle aches and pains), headache, loss of taste/smell or diarrhea? No   Have you had a potential exposure to COVID-19 within the past 14 days by:  o Close contact with a confirmed case? Yes - step-son has currently - tested 11/5  o Close contact with a healthcare worker,  or essential infrastructure worker (grocery store, [employer], gas station, public utilities or transportation)? No  o Do you reside in a congregate setting such as; skilled nursing facility, adult home, correctional facility, homeless shelter or other institutional setting? No  o Have you had recent travel to a known COVID-19 hotspot? No    Indicate if the patient has a positive screen by answering yes to one or more of the above questions. Patients who test positive or screen positive prior to surgery or on the day of surgery should be evaluated in conjunction with the surgeon/proceduralist/anesthesiologist to determine the urgency of the procedure.

## 2020-11-09 NOTE — PROGRESS NOTES
Obstructive Sleep Apnea (JETHRO) Screening     Patient:  Lana Barnes    YOB: 1985      Medical Record #:  [id number]                     Date:  11/9/2020     1. Are you a loud and/or regular snorer? [x]  Yes       [] No    2. Have you been observed to gasp or stop breathing during sleep? [x]  Yes       [] No    3. Do you feel tired or groggy upon awakening or do you awaken with a headache? [x]  Yes       [] No    4. Are you often tired or fatigued during the wake time hours? [x]  Yes       [] No    5. Do you fall asleep sitting, reading, watching TV or driving? []  Yes       [x] No    6. Do you often have problems with memory or concentration? []  Yes       [x] No    **If patient's score is ? 3 they are considered high risk for JETHRO. An Anesthesia provider will evaluate the patient and develop a plan of care the day of surgery. Note:  If the patient's BMI is more than 35 kg m¯² , has neck circumference > 40 cm, and/or high blood pressure the risk is greater (© American Sleep Apnea Association, 2006).

## 2020-11-13 ENCOUNTER — ANESTHESIA (OUTPATIENT)
Dept: ENDOSCOPY | Age: 35
End: 2020-11-13
Payer: COMMERCIAL

## 2020-11-13 ENCOUNTER — HOSPITAL ENCOUNTER (OUTPATIENT)
Age: 35
Setting detail: OUTPATIENT SURGERY
Discharge: HOME | End: 2020-11-13
Attending: INTERNAL MEDICINE | Admitting: INTERNAL MEDICINE
Payer: COMMERCIAL

## 2020-11-13 ENCOUNTER — ANESTHESIA EVENT (OUTPATIENT)
Dept: ENDOSCOPY | Age: 35
End: 2020-11-13
Payer: COMMERCIAL

## 2020-11-13 VITALS
HEIGHT: 68 IN | DIASTOLIC BLOOD PRESSURE: 88 MMHG | WEIGHT: 174 LBS | BODY MASS INDEX: 26.37 KG/M2 | HEART RATE: 74 BPM | RESPIRATION RATE: 20 BRPM | TEMPERATURE: 97.7 F | SYSTOLIC BLOOD PRESSURE: 123 MMHG | OXYGEN SATURATION: 98 %

## 2020-11-13 VITALS — OXYGEN SATURATION: 100 % | DIASTOLIC BLOOD PRESSURE: 59 MMHG | SYSTOLIC BLOOD PRESSURE: 96 MMHG

## 2020-11-13 LAB — SARS-COV-2, NAAT: NOT DETECTED

## 2020-11-13 PROCEDURE — 43239 EGD BIOPSY SINGLE/MULTIPLE: CPT | Performed by: INTERNAL MEDICINE

## 2020-11-13 PROCEDURE — 3609010300 HC COLONOSCOPY W/BIOPSY SINGLE/MULTIPLE: Performed by: INTERNAL MEDICINE

## 2020-11-13 PROCEDURE — 3700000000 HC ANESTHESIA ATTENDED CARE: Performed by: INTERNAL MEDICINE

## 2020-11-13 PROCEDURE — 2709999900 HC NON-CHARGEABLE SUPPLY: Performed by: INTERNAL MEDICINE

## 2020-11-13 PROCEDURE — 88305 TISSUE EXAM BY PATHOLOGIST: CPT

## 2020-11-13 PROCEDURE — 7100000011 HC PHASE II RECOVERY - ADDTL 15 MIN: Performed by: INTERNAL MEDICINE

## 2020-11-13 PROCEDURE — 3700000001 HC ADD 15 MINUTES (ANESTHESIA): Performed by: INTERNAL MEDICINE

## 2020-11-13 PROCEDURE — 45380 COLONOSCOPY AND BIOPSY: CPT | Performed by: INTERNAL MEDICINE

## 2020-11-13 PROCEDURE — 6360000002 HC RX W HCPCS: Performed by: NURSE ANESTHETIST, CERTIFIED REGISTERED

## 2020-11-13 PROCEDURE — 2580000003 HC RX 258: Performed by: INTERNAL MEDICINE

## 2020-11-13 PROCEDURE — 2500000003 HC RX 250 WO HCPCS: Performed by: NURSE ANESTHETIST, CERTIFIED REGISTERED

## 2020-11-13 PROCEDURE — 7100000010 HC PHASE II RECOVERY - FIRST 15 MIN: Performed by: INTERNAL MEDICINE

## 2020-11-13 PROCEDURE — 3609012400 HC EGD TRANSORAL BIOPSY SINGLE/MULTIPLE: Performed by: INTERNAL MEDICINE

## 2020-11-13 RX ORDER — SODIUM CHLORIDE, SODIUM LACTATE, POTASSIUM CHLORIDE, CALCIUM CHLORIDE 600; 310; 30; 20 MG/100ML; MG/100ML; MG/100ML; MG/100ML
INJECTION, SOLUTION INTRAVENOUS CONTINUOUS PRN
Status: DISCONTINUED | OUTPATIENT
Start: 2020-11-13 | End: 2020-11-13 | Stop reason: ALTCHOICE

## 2020-11-13 RX ORDER — LIDOCAINE HYDROCHLORIDE 20 MG/ML
INJECTION, SOLUTION INFILTRATION; PERINEURAL PRN
Status: DISCONTINUED | OUTPATIENT
Start: 2020-11-13 | End: 2020-11-13 | Stop reason: SDUPTHER

## 2020-11-13 RX ORDER — PROPOFOL 10 MG/ML
INJECTION, EMULSION INTRAVENOUS PRN
Status: DISCONTINUED | OUTPATIENT
Start: 2020-11-13 | End: 2020-11-13 | Stop reason: SDUPTHER

## 2020-11-13 RX ORDER — SODIUM CHLORIDE, SODIUM LACTATE, POTASSIUM CHLORIDE, CALCIUM CHLORIDE 600; 310; 30; 20 MG/100ML; MG/100ML; MG/100ML; MG/100ML
INJECTION, SOLUTION INTRAVENOUS CONTINUOUS
Status: DISCONTINUED | OUTPATIENT
Start: 2020-11-13 | End: 2020-11-13 | Stop reason: HOSPADM

## 2020-11-13 RX ADMIN — PROPOFOL 30 MG: 10 INJECTION, EMULSION INTRAVENOUS at 09:25

## 2020-11-13 RX ADMIN — SODIUM CHLORIDE, POTASSIUM CHLORIDE, SODIUM LACTATE AND CALCIUM CHLORIDE: 600; 310; 30; 20 INJECTION, SOLUTION INTRAVENOUS at 08:45

## 2020-11-13 RX ADMIN — PROPOFOL 20 MG: 10 INJECTION, EMULSION INTRAVENOUS at 09:18

## 2020-11-13 RX ADMIN — LIDOCAINE HYDROCHLORIDE 100 MG: 20 INJECTION, SOLUTION INFILTRATION; PERINEURAL at 09:17

## 2020-11-13 RX ADMIN — PROPOFOL 20 MG: 10 INJECTION, EMULSION INTRAVENOUS at 09:27

## 2020-11-13 RX ADMIN — PROPOFOL 30 MG: 10 INJECTION, EMULSION INTRAVENOUS at 09:32

## 2020-11-13 RX ADMIN — PROPOFOL 20 MG: 10 INJECTION, EMULSION INTRAVENOUS at 09:21

## 2020-11-13 RX ADMIN — PROPOFOL 60 MG: 10 INJECTION, EMULSION INTRAVENOUS at 09:17

## 2020-11-13 RX ADMIN — PROPOFOL 30 MG: 10 INJECTION, EMULSION INTRAVENOUS at 09:24

## 2020-11-13 RX ADMIN — PROPOFOL 20 MG: 10 INJECTION, EMULSION INTRAVENOUS at 09:19

## 2020-11-13 RX ADMIN — PROPOFOL 20 MG: 10 INJECTION, EMULSION INTRAVENOUS at 09:37

## 2020-11-13 ASSESSMENT — LIFESTYLE VARIABLES: SMOKING_STATUS: 1

## 2020-11-13 ASSESSMENT — PAIN SCALES - GENERAL
PAINLEVEL_OUTOF10: 0
PAINLEVEL_OUTOF10: 0

## 2020-11-13 ASSESSMENT — PAIN - FUNCTIONAL ASSESSMENT: PAIN_FUNCTIONAL_ASSESSMENT: 0-10

## 2020-11-13 NOTE — H&P
[address] [facility],  [address] [facility], [address] [place] [address]  Phone: [phone]   Fax:[phone]     CHIEF COMPLAINT           Chief Complaint   Patient presents with    New Patient       colitis, bacterial infection in colon, ulcers?  acid reflux, feels like food gets stuck in chest, spit up tarlike substance, black tar stool and stool like coffee grounds.       HPI (location/symptom, timing/onset, duration, quality/severity, context, modifying factors, and associated signs/symptoms)      Thank you ROSA JURADO MD for asking me to see Margarita Doty in consultation. He is a Single [1] White [1] 28 y.o. . male seen independently who presents with the following GI complaints:     Rosa Thomson  Had coffee ground emesis after drinking (modifying factor) Saturday with associated melena. Associated - Has had heartburn as long as he can remember and has chronic mixed dysphagia. Also has intermittent chronic diarrhea. No pertinent GI family history. Modifying factors - greasy food.   Was in ER for this 2 days ago and CT showed colitis and blood work a mild acute anemia.           Lab Results   Component Value Date     WBC 8.1 10/26/2020     HGB 13.4 (L) 10/26/2020     HCT 38.9 (L) 10/26/2020     MCV 89.6 10/26/2020      10/26/2020            Lab Results   Component Value Date      (L) 10/26/2020     K 4.2 10/26/2020      10/26/2020     CO2 29 10/26/2020     BUN 18 10/26/2020     CREATININE 0.8 (L) 10/26/2020     GLUCOSE 80 10/26/2020     CALCIUM 9.7 10/26/2020     PROT 6.8 10/26/2020     LABALBU 4.5 10/26/2020     BILITOT 0.3 10/26/2020     ALKPHOS 96 10/26/2020     AST 34 10/26/2020     ALT 25 10/26/2020     LABGLOM >60 10/26/2020     GFRAA >60 10/26/2020     AGRATIO 2.0 10/26/2020     GLOB 2.3 10/26/2020               Lab Results   Component Value Date     LIPASE 44.0 10/26/2020         CT ABDOMEN PELVIS W IV CONTRAST Additional Contrast? None  Narrative: EXAMINATION:  CT OF THE ABDOMEN AND PELVIS WITH CONTRAST 10/26/2020 8:11 pm     TECHNIQUE:  CT of the abdomen and pelvis was performed with the administration of  intravenous contrast. Multiplanar reformatted images are provided for review. Dose modulation, iterative reconstruction, and/or weight based adjustment of  the mA/kV was utilized to reduce the radiation dose to as low as reasonably  achievable.     COMPARISON:  None.     HISTORY:  ORDERING SYSTEM PROVIDED HISTORY: abd pain, concern for gi bleed  TECHNOLOGIST PROVIDED HISTORY:  Reason for exam:->abd pain, concern for gi bleed  Additional Contrast?->None  Reason for Exam: Emesis (coffee colored looking emesis all day yesterday)     FINDINGS:  Lower Chest: No acute infiltrate at the lung bases. Moderate-sized hiatal  hernia.     Organs: The liver, spleen, pancreas and adrenal glands are unremarkable. The  gallbladder is contracted with no biliary dilatation. No renal mass or  significant hydronephrosis.     GI/Bowel: There is mild colonic wall thickening in the mid sigmoid colon,  likely accentuated by incomplete distention but possibly a mild colitis. No  significant pericolonic inflammatory changes. The appendix is unremarkable. No small bowel distension. The distal stomach and duodenal sweep are  unremarkable.     Pelvis: No pelvic mass or free pelvic fluid. The prostate is not enlarged. Partial distention of the urinary bladder. Mild bladder wall thickening is  likely accentuated by incomplete distention.     Peritoneum/Retroperitoneum: The abdominal aorta is normal in caliber. No  retroperitoneal adenopathy or upper abdominal ascites.     Bones/Soft Tissues: No acute osseous or soft tissue abnormality. Impression: 1. Questionable mild thickening of the mid sigmoid colon, likely accentuated  by incomplete distention. The possibility of a mild colitis is raised. 2. Otherwise unremarkable CT of the abdomen and pelvis.   No evidence of  appendicitis. 3. Moderate-sized hiatal hernia.     Last Encounter Reviewed:  Pertinent PMH, FH, SH is reviewed below. Last EGD: none  Last Colonoscopy: none     Review of available records reveals:       Wt Readings from Last 50 Encounters:   10/29/20 174 lb 9.6 oz (79.2 kg)   10/28/20 173 lb (78.5 kg)   10/26/20 165 lb (74.8 kg)   06/10/20 165 lb (74.8 kg)   03/23/20 158 lb (71.7 kg)   02/20/20 151 lb (68.5 kg)   10/22/19 162 lb (73.5 kg)   05/03/18 165 lb (74.8 kg)   10/16/17 161 lb (73 kg)   10/15/17 165 lb (74.8 kg)   04/13/17 170 lb (77.1 kg)   11/17/16 158 lb (71.7 kg)   10/05/16 161 lb (73 kg)   08/05/16 160 lb (72.6 kg)   02/27/16 170 lb (77.1 kg)   10/20/11 165 lb (74.8 kg)         No components found for: HGBA1C      BP Readings from Last 3 Encounters:   10/29/20 (!) 147/92   10/28/20 124/72   10/26/20 115/82           Health Maintenance   Topic Date Due    DTaP/Tdap/Td vaccine (3 - Td) 05/03/2028    Flu vaccine  Completed    Pneumococcal 0-64 years Vaccine  Completed    HIV screen  Completed    Hepatitis A vaccine  Aged Out    Hepatitis B vaccine  Aged Out    Hib vaccine  Aged Out    Meningococcal (ACWY) vaccine  Aged Out    Varicella vaccine  Discontinued         No components found for: [employer]      PAST MEDICAL HISTORY      Past Medical History        Past Medical History:   Diagnosis Date    ADD (attention deficit disorder) 11/17/2016    GERD (gastroesophageal reflux disease)      Irritable bowel syndrome with diarrhea 2/20/2020        FAMILY HISTORY      Family History         Family History   Problem Relation Age of Onset    No Known Problems Mother      Heart Disease Father           2 heart attack     Heart Attack Father 36    No Known Problems Sister      No Known Problems Brother      Cancer Maternal Grandfather           colon cancer     Diabetes Paternal Grandmother      Cancer Paternal Grandmother           cervical cancer         SOCIAL HISTORY    Social History               Socioeconomic History    Marital status: Single       Spouse name: Not on file    Number of children: Not on file    Years of education: Not on file    Highest education level: Not on file   Occupational History    Not on file   Social Needs    Financial resource strain: Not on file    Food insecurity       Worry: Not on file       Inability: Not on file    Transportation needs       Medical: Not on file       Non-medical: Not on file   Tobacco Use    Smoking status: Current Every Day Smoker       Packs/day: 1.00       Years: 10.00       Pack years: 10.00       Types: Cigarettes       Last attempt to quit: 2019       Years since quittin.2    Smokeless tobacco: Never Used   Substance and Sexual Activity    Alcohol use:  Yes       Comment: occasionally    Drug use: No    Sexual activity: Yes       Partners: Female   Lifestyle    Physical activity       Days per week: Not on file       Minutes per session: Not on file    Stress: Not on file   Relationships    Social connections       Talks on phone: Not on file       Gets together: Not on file       Attends Muslim service: Not on file       Active member of club or organization: Not on file       Attends meetings of clubs or organizations: Not on file       Relationship status: Not on file    Intimate partner violence       Fear of current or ex partner: Not on file       Emotionally abused: Not on file       Physically abused: Not on file       Forced sexual activity: Not on file   Other Topics Concern    Not on file   Social History Narrative    Not on file        SURGICAL HISTORY      Past Surgical History         Past Surgical History:   Procedure Laterality Date    NASAL SEPTUM SURGERY           CURRENT MEDICATIONS   (This list may include medications prescribed during this encounter as epic can not insert only the list prior to this encounter.)  Current Outpatient Rx          Current Outpatient Rx   Medication Sig Dispense Refill    polyethylene glycol (MIRALAX) 17 GM/SCOOP POWD powder Take 238 g by mouth daily Take as directed for colonoscopy 255 g 0    triamcinolone (KENALOG) 0.1 % ointment Apply topically 2 times daily 15 Tube 1    metroNIDAZOLE (FLAGYL) 500 MG tablet Take 1 tablet by mouth 2 times daily for 10 days 20 tablet 0    ciprofloxacin (CIPRO) 500 MG tablet Take 1 tablet by mouth 2 times daily for 7 days 14 tablet 0    pantoprazole (PROTONIX) 40 MG tablet Take 1 tablet by mouth 2 times daily (before meals) 60 tablet 0        ALLERGIES           Allergies   Allergen Reactions    Amoxicillin Rash      IMMUNIZATIONS           Immunization History   Administered Date(s) Administered    Influenza Virus Vaccine 09/01/2017    Influenza, Patricia Spies, IM, (6 mo and older Fluzone, Flulaval, Fluarix and 3 yrs and older Afluria) 10/22/2019    Influenza, Quadv, IM, PF (6 mo and older Fluzone, Flulaval, Fluarix, and 3 yrs and older Afluria) 10/05/2016, 10/28/2020    Pneumococcal Polysaccharide ([id number]) 10/22/2019    Td, unspecified formulation 05/03/2018    Tdap (Boostrix, Adacel) 10/05/2016      REVIEW OF SYSTEMS (2-9 systems for level 4, 10 or more for level 5)   See HPI for further details and pertinent postiives. Negative for the following:  Constitutional: Negative for weight change. Negative for appetite change and fatigue. HENT: Negative for nosebleeds, sore throat, mouth sores, and voice change. Respiratory: Negative for cough, choking and chest tightness. Cardiovascular: Negative for chest pain   Gastrointestinal:  No abdominal pain, heartburn, bloating, dysphagia, cough, chest pain, globus, regurgitation, diarrhea, constipation, nausea, or vomiting. Positive for blood . Musculoskeletal: Negative for arthralgias. Skin: Negative for pallor. Neurological: Negative for weakness and light-headedness. Hematological: Negative for adenopathy. Does not bruise/bleed easily. Psychiatric/Behavioral: Negative for suicidal ideas. PHYSICAL EXAM (7 for level 4, 8 or more for level 5)   VITAL SIGNS: BP (!) 147/92 (Site: Left Wrist, Position: Sitting, Cuff Size: Medium Adult)   Pulse 82   Temp 97.7 °F (36.5 °C) (Temporal)   Ht 5' 8\" (1.727 m)   Wt 174 lb 9.6 oz (79.2 kg)   BMI 26.55 kg/m²       Wt Readings from Last 3 Encounters:   10/29/20 174 lb 9.6 oz (79.2 kg)   10/28/20 173 lb (78.5 kg)   10/26/20 165 lb (74.8 kg)      Constitutional: Well developed, Well nourished, No acute distress, Non-toxic appearance. HENT: Normocephalic, Atraumatic, Bilateral external ears normal, Oropharynx moist, No oral exudates, Nose normal.   Eyes: Conjunctiva normal, No discharge. Neck: Normal range of motion, No tenderness, Supple, No stridor. Lymphatic: No cervical, subclavian, or axillary lymphadenopathy. Cardiovascular: Normal heart rate, Normal rhythm, No murmurs, No rubs, No gallops. Thorax & Lungs: Normal breath sounds, No respiratory distress, No wheezing, No chest tenderness. No gynecomastia. Abdomen/GI: scars consistent with stated surgeries, no hernias, no HSM, soft NTND   Rectal:  Deferred. Skin: Warm, Dry, No erythema, No rash. No bruising. No spider hemangiomas. Back: No tenderness, No CVA tenderness. Lower Extremities: Intact distal pulses, No edema, No tenderness, No cyanosis, No clubbing. Neurologic: Alert & oriented x 3, Normal motor function, Normal sensory function, No focal deficits noted. No asterixis.   RADIOLOGY/PROCEDURES         FINAL IMPRESSION             Orders Placed This Encounter   Procedures    COLONOSCOPY W/ OR W/O BIOPSY       Scheduling Instructions:         Schedule with anesthesia provided diprivan.                   Please provide prep of choice instructions and prescription.                     General guidelines for holding blood thinners/anticoagulants around endoscopic procedure are but patients are encouraged to check with their prescribing physician.                   The patient may hold Plavix, Effient, Brilinta 5 days prior to the procedure unless:          A drug eluting stent has been placed within past 12 months.         A nondrug eluting stent has been placed within past 1 month.         Coumadin may be held 4 days prior to the procedure unless:           Mechanical mitral valve replacement (requires heparin bridge while Coumadin held and is managed by pharmacy)         Pradaxa, Xarelto, Eliquis may be held 2-3 days prior to procedure. According to pharmacokinetics of the drug, package insert, cardiology practice patterns, and T1/2 of theses drugs (12 hrs), Eliquis and Xarelto are held 48hrs prior to any procedure, including major surgical procedures w/o          increased bleeding.  That is usually the standard of care, as coagulation would/should be normalized at 48hrs.         Every attempt should be made to maintain ASA 81mg per day throughout the lupillo-operative period in patients with diagnosis of ASHD.       These recommendations may need to be modified by the provider/ based on risk /benefit analysis of the procedure and the patients history.                   If anticoagulation can not be held because recent cardiac stent, elective endoscopic procedures should be delayed until they have received the minimum duration of recommended antiplatlet therapy and it can safely be held. Again if unsure, patient should discuss with prescribing physician/service.                   If anticoagulation can not be stopped, endoscopic procedures can still be performed either diagnostically at a somewhat higher risk. Understand that any therapeutic procedure where anything beyond looking is performed, carries higher risks.   For this reason without overt bleeding other testing          such as cologuard may be more appropriate.                     High risk endoscopic procedures that require stopping antiplatelet and anticoagulation therapy include polypectomy, biliary or pancreatic sphincterotomy, pneumatic or bougie dilation, PEG placement, therapeutic balloon-assisted enteroscopy, EUS and FNA, tumor ablation by any technique,          cystogastrostomy,and treatment of varices.       Order Specific Question:   Screening or Diagnostic?       Answer:   Diagnostic    Covid-19 Ambulatory       Standing Status:   Future       Standing Expiration Date:   10/29/2021       Scheduling Instructions:         Saline media preferred given current shortage of viral transport media but both acceptable       Order Specific Question:   Status       Answer:   Asymptomatic/Surveillance (e.g. pre-op/pre-procedure, pre-delivery, transfer)       Order Specific Question:   Reason for Test       Answer:   Upcoming elective surgery/procedure/delivery, return to work, or discharge to another facility    EGD       Scheduling Instructions:         Schedule with anesthesia provided diprivan sedation.                   Please provide prep of choice instructions and prescription.                     General guidelines for holding blood thinners/anticoagulants around endoscopic procedure are but patients are encouraged to check with their prescribing physician.                   The patient may hold Plavix, Effient, Brilinta 5 days prior to the procedure unless:          A drug eluting stent has been placed within past 12 months.         A nondrug eluting stent has been placed within past 1 month.         Coumadin may be held 4 days prior to the procedure unless:           Mechanical mitral valve replacement (requires heparin bridge while Coumadin held and is managed by pharmacy)         Pradaxa, Xarelto, Eliquis may be held 2-3 days prior to procedure.   According to pharmacokinetics of the drug, package insert, cardiology practice patterns, and T1/2 of theses drugs (12 hrs), Eliquis and Xarelto are held 48hrs prior to any procedure, including major surgical procedures w/o          increased bleeding.  That is usually the standard of care, as coagulation would/should be normalized at 48hrs.         Every attempt should be made to maintain ASA 81mg per day throughout the lupillo-operative period in patients with diagnosis of ASHD.       These recommendations may need to be modified by the provider/ based on risk /benefit analysis of the procedure and the patients history.                   If anticoagulation can not be held because recent cardiac stent, elective endoscopic procedures should be delayed until they have received the minimum duration of recommended antiplatlet therapy and it can safely be held. Again if unsure, patient should discuss with prescribing physician/service.                   If anticoagulation can not be stopped, endoscopic procedures can still be performed either diagnostically at a somewhat higher risk. Understand that any therapeutic procedure where anything beyond looking is performed, carries higher risks. For this reason without overt bleeding other testing          such as cologuard may be more appropriate.                     High risk endoscopic procedures that require stopping antiplatelet and anticoagulation therapy include polypectomy, biliary or pancreatic sphincterotomy, pneumatic or bougie dilation, PEG placement, therapeutic balloon-assisted enteroscopy, EUS and FNA, tumor ablation by any technique,          cystogastrostomy,and treatment of varices.       Order Specific Question:   Screening or Diagnostic?       Answer:   Diagnostic      Amelia Peters was seen today for new patient.     Diagnoses and all orders for this visit:     Hematemesis, presence of nausea not specified  -     EGD     Preop testing  -     Covid-19 Ambulatory; Future     Abnormal finding on GI tract imaging  -     COLONOSCOPY W/ OR W/O BIOPSY  -     bisacodyl (DULCOLAX) 5 MG EC tablet; Take 4 tablets by mouth once for 1 dose Take as directed for colonoscopy.   -

## 2020-11-13 NOTE — OP NOTE
[address] ,  [address] [facility]  Phone: [phone] [facility] [address],  [address] [facility], [address] [place] [address]  Phone: [phone]   ZQZ:[phone]    EGD & Colonoscopy Procedure Note    Patient: Tigre Madison  : 1985    Procedure: Esophagogastroduodenoscopy with biopsy and Colonoscopy with biopsy    Date:  2020     Endoscopist:  Ish Mcgee MD    Referring Physician:  Narayan Montano MD    Preoperative Diagnosis:  Hematemesis, presence of nausea not specified [K92.0]  Diarrhea, unspecified type [R19.7]  Gastrointestinal tract imaging abnormality [R93.3]  Heartburn [R12]    Postoperative Diagnosis:  See impression    Anesthesia: Anesthesia: MAC  ASA Class: 2  Mallampati: II (soft palate, uvula, fauces visible)    Indications: This is a 28y.o. year old male who presents today with Hematemesis, Melena and diarrhea, abnormal gi imaging, acute blood loss anemia. Procedure Details  Informed consent was obtained for the procedure, including sedation. Risks of perforation, hemorrhage, adverse drug reaction and aspiration were discussed. The patient was placed in the left lateral decubitus position. Based on the pre-procedure assessment, including review of the patient's medical history, medications, allergies, and review of systems, he had been deemed to be an appropriate candidate for conscious sedation; he was therefore sedated with the medications listed below. The patient was monitored continuously with ECG tracing, pulse oximetry, blood pressure monitoring, and direct observations. A gastroscope was inserted into the mouth and advanced under direct vision to second portion of the duodenum. A careful inspection was made as the gastroscope was withdrawn, including a retroflexed view of the proximal stomach including views of the incisura and cardia; findings and interventions are described below.     Rectal

## 2020-11-13 NOTE — ANESTHESIA PRE PROCEDURE
Department of Anesthesiology  Preprocedure Note       Name:  Tigre Madison   Age:  28 y.o.  :  1985                                          MRN:  [id number]         Date:  2020      Surgeon: Reid Joshi):  Derrick Valdez MD    Procedure: Procedure(s):  COLONOSCOPY  -JETHRO=4-  ESOPHAGOGASTRODUODENOSCOPY WITH ANESTHESIA    Medications prior to admission:   Prior to Admission medications    Medication Sig Start Date End Date Taking? Authorizing Provider   polyethylene glycol (MIRALAX) 17 GM/SCOOP POWD powder Take 238 g by mouth daily Take as directed for colonoscopy 10/29/20   Derrick Valdez MD   triamcinolone (KENALOG) 0.1 % ointment Apply topically 2 times daily 10/28/20 11/27/20  Marques Valadez MD   pantoprazole (PROTONIX) 40 MG tablet Take 1 tablet by mouth 2 times daily (before meals) 10/26/20 11/25/20  Thao Bradshaw PA-C       Current medications:    Current Facility-Administered Medications   Medication Dose Route Frequency Provider Last Rate Last Dose    lactated ringers infusion   Intravenous Continuous Derrick Valdez MD         Current Outpatient Medications   Medication Sig Dispense Refill    polyethylene glycol (MIRALAX) 17 GM/SCOOP POWD powder Take 238 g by mouth daily Take as directed for colonoscopy 255 g 0    triamcinolone (KENALOG) 0.1 % ointment Apply topically 2 times daily 15 Tube 1    pantoprazole (PROTONIX) 40 MG tablet Take 1 tablet by mouth 2 times daily (before meals) 60 tablet 0       Allergies:     Allergies   Allergen Reactions    Amoxicillin Rash       Problem List:    Patient Active Problem List   Diagnosis Code    Adjustment disorder with anxiety F43.22    ADD (attention deficit disorder) F98.8    Controlled substance agreement signed Z79.899    Vitamin D deficiency E55.9    Irritable bowel syndrome with diarrhea K58.0       Past Medical History:        Diagnosis Date    ADD (attention deficit disorder) 2016    GERD (gastroesophageal reflux disease)     Irritable bowel syndrome with diarrhea 2020       Past Surgical History:        Procedure Laterality Date    NASAL SEPTUM SURGERY         Social History:    Social History     Tobacco Use    Smoking status: Current Every Day Smoker     Packs/day: 1.00     Years: 10.00     Pack years: 10.00     Types: Cigarettes     Last attempt to quit: 2019     Years since quittin.3    Smokeless tobacco: Never Used   Substance Use Topics    Alcohol use: Yes     Comment: occasionally 1-2 per month                                Ready to quit: Not Answered  Counseling given: Not Answered      Vital Signs (Current):   Vitals:    20 1543   Weight: 174 lb (78.9 kg)   Height: 5' 8\" (1.727 m)                                              BP Readings from Last 3 Encounters:   10/29/20 (!) 147/92   10/28/20 124/72   10/26/20 115/82       NPO Status:                                                                                 BMI:   Wt Readings from Last 3 Encounters:   10/29/20 174 lb 9.6 oz (79.2 kg)   10/28/20 173 lb (78.5 kg)   10/26/20 165 lb (74.8 kg)     Body mass index is 26.46 kg/m². CBC:   Lab Results   Component Value Date    WBC 8.1 10/26/2020    RBC 4.34 10/26/2020    HGB 13.4 10/26/2020    HCT 38.9 10/26/2020    MCV 89.6 10/26/2020    RDW 13.6 10/26/2020     10/26/2020       CMP:   Lab Results   Component Value Date     10/26/2020    K 4.2 10/26/2020     10/26/2020    CO2 29 10/26/2020    BUN 18 10/26/2020    CREATININE 0.8 10/26/2020    GFRAA >60 10/26/2020    GFRAA >60 2012    AGRATIO 2.0 10/26/2020    LABGLOM >60 10/26/2020    GLUCOSE 80 10/26/2020    PROT 6.8 10/26/2020    CALCIUM 9.7 10/26/2020    BILITOT 0.3 10/26/2020    ALKPHOS 96 10/26/2020    AST 34 10/26/2020    ALT 25 10/26/2020       POC Tests: No results for input(s): POCGLU, POCNA, POCK, POCCL, POCBUN, POCHEMO, POCHCT in the last 72 hours.     Coags: No results found for: PROTIME, INR, APTT    HCG (If Applicable): No results found for: PREGTESTUR, PREGSERUM, HCG, HCGQUANT     ABGs: No results found for: PHART, PO2ART, FLN7CFN, WLU2JZD, BEART, Q5IIZHYZ     Type & Screen (If Applicable):  No results found for: LABABO, LABRH    Drug/Infectious Status (If Applicable):  No results found for: HIV, HEPCAB    COVID-19 Screening (If Applicable):   Lab Results   Component Value Date    COVID19 Not Detected 11/07/2020         Anesthesia Evaluation  Patient summary reviewed and Nursing notes reviewed no history of anesthetic complications:   Airway: Mallampati: II  TM distance: >3 FB   Neck ROM: full  Mouth opening: > = 3 FB Dental: normal exam         Pulmonary:   (+) current smoker                           Cardiovascular:Negative CV ROS                      Neuro/Psych:   (+) psychiatric history (ADD):            GI/Hepatic/Renal: Neg GI/Hepatic/Renal ROS  (+) GERD: well controlled,      (-) liver disease and no renal disease       Endo/Other: Negative Endo/Other ROS       (-) diabetes mellitus               Abdominal:           Vascular: negative vascular ROS. Anesthesia Plan      TIVA     ASA 2       Induction: intravenous. Anesthetic plan and risks discussed with patient. Plan discussed with CRNA. All questions answered and agrees with plan.         Joel Sanchez MD   11/13/2020

## 2020-11-13 NOTE — ANESTHESIA POSTPROCEDURE EVALUATION
Department of Anesthesiology  Postprocedure Note    Patient: Randa Barnes  MRN: [id number]  YOB: 1985  Date of evaluation: 11/13/2020  Time:  12:15 PM     Procedure Summary     Date:  11/13/20 Room / Location:  [address] [place] [address] [address] [address]    Anesthesia Start:  0915 Anesthesia Stop:  Di Gravely    Procedures:       COLONOSCOPY WITH BIOPSY (N/A Abdomen)      EGD BIOPSY (N/A Esophagus) Diagnosis:       Hematemesis, presence of nausea not specified      Diarrhea, unspecified type      Gastrointestinal tract imaging abnormality      Heartburn      (HEMATEMESIS, HEARTBURN, DIARRHEA, ABNORMAL FINDING ON GASTROINTESTINAL TRACT IMAGING)    Surgeon:  Susan Pugh MD Responsible Provider:  Willi De La Vega MD    Anesthesia Type:  TIVA ASA Status:  2          Anesthesia Type: TIVA    Kat Phase I: Kat Score: 10    Kat Phase II: Kat Score: 10    Last vitals: Reviewed and per EMR flowsheets.        Anesthesia Post Evaluation    Patient location during evaluation: PACU  Level of consciousness: awake  Airway patency: patent  Nausea & Vomiting: no nausea  Complications: no  Cardiovascular status: blood pressure returned to baseline  Respiratory status: acceptable  Hydration status: euvolemic

## 2020-11-16 NOTE — RESULT ENCOUNTER NOTE
Please call patient with normal results.   -Continue acid suppression twice a day  -FU with continued symptoms

## 2020-12-24 ENCOUNTER — PATIENT MESSAGE (OUTPATIENT)
Dept: INTERNAL MEDICINE CLINIC | Age: 35
End: 2020-12-24

## 2020-12-28 RX ORDER — PANTOPRAZOLE SODIUM 40 MG/1
40 TABLET, DELAYED RELEASE ORAL
Qty: 60 TABLET | Refills: 0 | Status: SHIPPED | OUTPATIENT
Start: 2020-12-28 | End: 2021-02-01 | Stop reason: SDUPTHER

## 2020-12-28 NOTE — TELEPHONE ENCOUNTER
PROTONIX 40MG bid for GERD    LAST REFILL 10/26/2020  QUANTITY  60         REFILLS  0  LAST VISIT  10/28/2020  NEXT VISIT  N/a due for physical next year    I can schedule for GERD follow up if needed.

## 2020-12-28 NOTE — TELEPHONE ENCOUNTER
I have PEND the refill to [facility]. I will reach out to patient to schedule for a follow up within 30 days.

## 2020-12-28 NOTE — TELEPHONE ENCOUNTER
Jett Goodwin, GERD follow up is probably a good idea. We can order this medication.  DID you send me a RX request? Thanks, Justin Arnold

## 2020-12-28 NOTE — TELEPHONE ENCOUNTER
From: Tiburcio Nicole  To: Francis Diamond MD  Sent: 12/24/2020 12:13 PM EST  Subject: Prescription Question    I wanted to see if I can get a refill for pantoprazole 40mg

## 2021-02-01 ENCOUNTER — OFFICE VISIT (OUTPATIENT)
Dept: INTERNAL MEDICINE CLINIC | Age: 36
End: 2021-02-01
Payer: COMMERCIAL

## 2021-02-01 VITALS
OXYGEN SATURATION: 99 % | DIASTOLIC BLOOD PRESSURE: 86 MMHG | HEIGHT: 68 IN | SYSTOLIC BLOOD PRESSURE: 132 MMHG | BODY MASS INDEX: 27.13 KG/M2 | TEMPERATURE: 97.7 F | WEIGHT: 179 LBS | HEART RATE: 82 BPM

## 2021-02-01 DIAGNOSIS — R51.9 HEADACHE DISORDER: Primary | ICD-10-CM

## 2021-02-01 DIAGNOSIS — K21.9 GASTROESOPHAGEAL REFLUX DISEASE WITHOUT ESOPHAGITIS: ICD-10-CM

## 2021-02-01 PROCEDURE — G8427 DOCREV CUR MEDS BY ELIG CLIN: HCPCS | Performed by: INTERNAL MEDICINE

## 2021-02-01 PROCEDURE — 4004F PT TOBACCO SCREEN RCVD TLK: CPT | Performed by: INTERNAL MEDICINE

## 2021-02-01 PROCEDURE — G8419 CALC BMI OUT NRM PARAM NOF/U: HCPCS | Performed by: INTERNAL MEDICINE

## 2021-02-01 PROCEDURE — 99213 OFFICE O/P EST LOW 20 MIN: CPT | Performed by: INTERNAL MEDICINE

## 2021-02-01 PROCEDURE — G8482 FLU IMMUNIZE ORDER/ADMIN: HCPCS | Performed by: INTERNAL MEDICINE

## 2021-02-01 RX ORDER — TOPIRAMATE 50 MG/1
50 CAPSULE, EXTENDED RELEASE ORAL DAILY
Qty: 14 CAPSULE | Refills: 0 | Status: SHIPPED | OUTPATIENT
Start: 2021-02-01 | End: 2021-03-25 | Stop reason: ALTCHOICE

## 2021-02-01 RX ORDER — PANTOPRAZOLE SODIUM 40 MG/1
40 TABLET, DELAYED RELEASE ORAL
Qty: 60 TABLET | Refills: 8 | Status: SHIPPED | OUTPATIENT
Start: 2021-02-01 | End: 2021-11-11 | Stop reason: SDUPTHER

## 2021-02-01 RX ORDER — TOPIRAMATE 25 MG/1
25 CAPSULE, EXTENDED RELEASE ORAL DAILY
Qty: 14 CAPSULE | Refills: 0 | Status: SHIPPED | OUTPATIENT
Start: 2021-02-01 | End: 2021-03-25 | Stop reason: ALTCHOICE

## 2021-02-01 RX ORDER — SUMATRIPTAN 100 MG/1
100 TABLET, FILM COATED ORAL
Qty: 9 TABLET | Refills: 0 | Status: SHIPPED | OUTPATIENT
Start: 2021-02-01 | End: 2021-02-01

## 2021-02-01 ASSESSMENT — PATIENT HEALTH QUESTIONNAIRE - PHQ9
SUM OF ALL RESPONSES TO PHQ9 QUESTIONS 1 & 2: 0
SUM OF ALL RESPONSES TO PHQ QUESTIONS 1-9: 0
SUM OF ALL RESPONSES TO PHQ QUESTIONS 1-9: 0

## 2021-02-01 NOTE — PROGRESS NOTES
 Irritable bowel syndrome with diarrhea    Hematemesis    Heartburn    Esophagitis, [place] grade B    Gastritis and duodenitis    Diarrhea    Gastrointestinal tract imaging abnormality    Melena       Allergies   Allergen Reactions    Amoxicillin Rash     No outpatient medications have been marked as taking for the 2/1/21 encounter (Office Visit) with Candelaria Simon MD.         Review of Systems: 14 systems were negative except of what was stated on HPI    Nursing note and vitals reviewed. Vitals:    02/01/21 1113   BP: 132/86   Pulse: 82   Temp: 97.7 °F (36.5 °C)   TempSrc: Infrared   SpO2: 99%   Weight: 179 lb (81.2 kg)   Height: 5' 8\" (1.727 m)     Wt Readings from Last 3 Encounters:   02/01/21 179 lb (81.2 kg)   11/13/20 174 lb (78.9 kg)   10/29/20 174 lb 9.6 oz (79.2 kg)     BP Readings from Last 3 Encounters:   02/01/21 132/86   11/13/20 123/88   11/13/20 (!) 96/59     Body mass index is 27.22 kg/m². Constitutional: Patient appears well-developed and well-nourished. No distress. Head: Normocephalic and atraumatic. Neck: Normal range of motion. Neck supple. No thyroidmegaly. Cardiovascular: Normal rate, regular rhythm, normal heart sounds and intact distal pulses. Pulmonary/Chest: Effort normal and breath sounds normal. No stridor. No respiratory distress. No wheezes and no rales. Abdominal: Soft. Bowel sounds are normal. No distension and no mass. No tenderness. No rebound and no guarding. Musculoskeletal: No edema and no tenderness. Skin: No rash or erythema. Psychiatric: Normal mood and affect. Behavior is normal.     Assessment/Plan:  Kevan Alvarez was seen today for medication check and headache. Diagnoses and all orders for this visit:    Headache disorder  Start SUMAtriptan (IMITREX) 100 MG tablet;  Take 1 tablet by mouth once as needed for Migraine Repeat in 2 hrs prn, max 2 pills within 24 hr Start  topiramate ER (TROKENDI XR) 25 MG CP24; Take 25 mg by mouth daily for 14 days  Start  topiramate ER (TROKENDI XR) 50 MG CP24; Take 50 mg by mouth daily    Gastroesophageal reflux disease without esophagitis  -     pantoprazole (PROTONIX) 40 MG tablet;  Take 1 tablet by mouth 2 times daily (before meals)      Return Feb 25 at 2 VV headaches and, for Oct 20 7:30 Fasting Physical.

## 2021-02-01 NOTE — PATIENT INSTRUCTIONS
Topamax 50 mg:  start with 1/2 pill in the evening for 1 week, then increase to 1 pill at night for 1 week and continue to increase by 1/2 a pill every week up to 1 pill twice a day until you can get in with your provider. You can take a Vit C if you have numbness and tingling and these side effects usually will go away once you're used to the medicine. If the numbness/tingling gets bad, go back down to the last dose you were on until you get used to it longer. You may notice a decrease in your appetite with the medication or sodas may taste bad.     you will start on the Imitrex 100 mg as needed at onset of headaches so make sure you carry a pill with you (start with 1/2 pill to make sure you don't have any side effects and if persistent headache in 1/2 an hour, you can take the other half. If you do need more than 1/2 a pill, make sure you start on a whole pill in the future right when you get the headache since the medication does not work well when you wait too long. No more than 2 pills within 24 hr).

## 2021-02-25 ENCOUNTER — TELEMEDICINE (OUTPATIENT)
Dept: INTERNAL MEDICINE CLINIC | Age: 36
End: 2021-02-25
Payer: COMMERCIAL

## 2021-02-25 DIAGNOSIS — G43.001 MIGRAINE WITHOUT AURA AND WITH STATUS MIGRAINOSUS, NOT INTRACTABLE: Primary | ICD-10-CM

## 2021-02-25 PROCEDURE — G8427 DOCREV CUR MEDS BY ELIG CLIN: HCPCS | Performed by: INTERNAL MEDICINE

## 2021-02-25 PROCEDURE — 99213 OFFICE O/P EST LOW 20 MIN: CPT | Performed by: INTERNAL MEDICINE

## 2021-02-25 RX ORDER — GALCANEZUMAB 120 MG/ML
120 INJECTION, SOLUTION SUBCUTANEOUS
Qty: 1 PEN | Refills: 0 | Status: SHIPPED | OUTPATIENT
Start: 2021-02-25 | End: 2021-03-25 | Stop reason: SDUPTHER

## 2021-02-25 NOTE — PROGRESS NOTES
Date of Service:  2/25/2021    Chief Complaint:      Chief Complaint   Patient presents with    Headache       HPI:  Yobani Reid is a 28 y.o. Pursuant to the emergency declaration under the [facility], [facility] authority and the [employer] Act, this Virtual  Video Visit was conducted, with patient's consent, to reduce the patient's risk of exposure to COVID-19 and provide continuity of care. Service is  provided through a video synchronous discussion virtually to substitute for in-person clinic visit with the patient being at home and Dr. Ulices Garcia being at home. Headaches:  Still HA 2-3 weeks without much improvement on Trokendi 50 mg qd. He's been noticing some forgetfulness, confusion and difficulty searching for words. Imitrex 100 mg did help after taking a nap. He drinks 2 energy drinks a day which has not changed. He's getting the same 8 hrs of sleep. His eyes have been get blurry lately and going \"cross eye\" so he went to see an opthal but told he had 20/20 vision.   Uncle had a brain tumor and he's concern about that.     GERD:  he started taking on Protonix 40 mg 2 qhs and started having symptoms in the AM so he went back to taking 1 bid and now no symptoms.     Anxiety: stable off zoloft due to ED     Lab Results   Component Value Date    LABMICR Not Indicated 10/26/2020     Lab Results   Component Value Date     (L) 10/26/2020    K 4.2 10/26/2020     10/26/2020    CO2 29 10/26/2020    BUN 18 10/26/2020    CREATININE 0.8 (L) 10/26/2020    GLUCOSE 80 10/26/2020    CALCIUM 9.7 10/26/2020     Lab Results   Component Value Date    CHOL 139 10/22/2019    TRIG 90 10/22/2019    HDL 43 10/22/2019    LDLCALC 78 10/22/2019     Lab Results   Component Value Date    ALT 25 10/26/2020    AST 34 10/26/2020     Lab Results   Component Value Date    TSH 3.30 10/16/2017    T4FREE 1.2 10/16/2017 Lab Results   Component Value Date    WBC 8.1 10/26/2020    HGB 13.4 (L) 10/26/2020    HCT 38.9 (L) 10/26/2020    MCV 89.6 10/26/2020     10/26/2020     No results found for: INR   No results found for: PSA   No results found for: [facility]     Patient Active Problem List   Diagnosis    Adjustment disorder with anxiety    ADD (attention deficit disorder)    Controlled substance agreement signed    Vitamin D deficiency    Irritable bowel syndrome with diarrhea    Hematemesis    Heartburn    Esophagitis, [place] grade B    Gastritis and duodenitis    Diarrhea    Gastrointestinal tract imaging abnormality    Melena    Headache disorder       Allergies   Allergen Reactions    Amoxicillin Rash     Outpatient Medications Marked as Taking for the 2/25/21 encounter (Telemedicine) with Andrea Valadez MD   Medication Sig Dispense Refill    pantoprazole (PROTONIX) 40 MG tablet Take 1 tablet by mouth 2 times daily (before meals) 60 tablet 8    topiramate ER (TROKENDI XR) 50 MG CP24 Take 50 mg by mouth daily 14 capsule 0    polyethylene glycol (MIRALAX) 17 GM/SCOOP POWD powder Take 238 g by mouth daily Take as directed for colonoscopy 255 g 0         Review of Systems: 14 systems were negative except of what was stated on HPI    Nursing note and vitals reviewed. There were no vitals filed for this visit. Wt Readings from Last 3 Encounters:   02/01/21 179 lb (81.2 kg)   11/13/20 174 lb (78.9 kg)   10/29/20 174 lb 9.6 oz (79.2 kg)     BP Readings from Last 3 Encounters:   02/01/21 132/86   11/13/20 123/88   11/13/20 (!) 96/59     There is no height or weight on file to calculate BMI. Constitutional: Patient appears well-developed and well-nourished. No distress. Head: Normocephalic and atraumatic. Skin: No rash or erythema. Psychiatric: Normal mood and affect. Behavior is normal.       Assessment/Plan:  Heavenly Nelson was seen today for headache.     Diagnoses and all orders for this visit: Migraine without aura and with status migrainosus, not intractable  Start Galcanezumab-gnlm (EMGALITY) 120 MG/ML SOAJ; Inject 120 mg into the skin every 30 days  D/C Topamax since confusion with med. If no improvement, will refer to neurologist.      Return March 25 at 1:40 VV  Headache.

## 2021-03-25 ENCOUNTER — TELEMEDICINE (OUTPATIENT)
Dept: INTERNAL MEDICINE CLINIC | Age: 36
End: 2021-03-25
Payer: COMMERCIAL

## 2021-03-25 DIAGNOSIS — G43.001 MIGRAINE WITHOUT AURA AND WITH STATUS MIGRAINOSUS, NOT INTRACTABLE: ICD-10-CM

## 2021-03-25 PROCEDURE — G8427 DOCREV CUR MEDS BY ELIG CLIN: HCPCS | Performed by: INTERNAL MEDICINE

## 2021-03-25 PROCEDURE — 99213 OFFICE O/P EST LOW 20 MIN: CPT | Performed by: INTERNAL MEDICINE

## 2021-03-25 RX ORDER — UBROGEPANT 100 MG/1
100 TABLET ORAL DAILY PRN
Qty: 10 TABLET | Refills: 3 | Status: SHIPPED | OUTPATIENT
Start: 2021-03-25 | End: 2021-11-11 | Stop reason: ALTCHOICE

## 2021-03-25 RX ORDER — GALCANEZUMAB 120 MG/ML
120 INJECTION, SOLUTION SUBCUTANEOUS
Qty: 1 PEN | Refills: 0 | Status: SHIPPED | OUTPATIENT
Start: 2021-03-25 | End: 2021-11-11 | Stop reason: ALTCHOICE

## 2021-03-25 NOTE — PROGRESS NOTES
Date of Service:  3/25/2021    Chief Complaint:      Chief Complaint   Patient presents with    Headache       HPI:  Lex Hodgkin is a 28 y.o. Pursuant to the emergency declaration under the [facility] [place] [address] [facility] authority and the [employer] Act, this Virtual  Video Visit was conducted, with patient's consent, to reduce the patient's risk of exposure to COVID-19 and provide continuity of care. Service is  provided through a video synchronous discussion virtually to substitute for in-person clinic visit with the patient being at home and Dr. Sandeep Alford being at home. Patient consent to the video visit. Headaches:  Still mild nondebilitating HA 4 times weeks without much improvement on Emgality 120 mg qweek. He did get off Trokendi and still forgetful with intermittent confusion and difficulty searching for words. Imitrex 100 mg didn't work very well for severe HA.  He drinks 2 energy drinks a day which has not changed.  He's getting the same 8 hrs of sleep.  His eyes have been get blurry lately and going \"cross eye\" so he went to see an opthal but told he had 20/20 vision.  Uncle had a brain tumor and he's concern about that.     GERD:  he started taking on Protonix 40 mg 2 qhs and started having symptoms in the AM so he went back to taking 1 bid and now no symptoms.     Anxiety: stable off zoloft due to ED     Lab Results   Component Value Date    LABMICR Not Indicated 10/26/2020     Lab Results   Component Value Date     (L) 10/26/2020    K 4.2 10/26/2020     10/26/2020    CO2 29 10/26/2020    BUN 18 10/26/2020    CREATININE 0.8 (L) 10/26/2020    GLUCOSE 80 10/26/2020    CALCIUM 9.7 10/26/2020     Lab Results   Component Value Date    CHOL 139 10/22/2019    TRIG 90 10/22/2019    HDL 43 10/22/2019    LDLCALC 78 10/22/2019     Lab Results   Component Value Date    ALT 25 10/26/2020    AST 34 10/26/2020     Lab Results   Component Value Date    TSH 3.30 10/16/2017    T4FREE 1.2 10/16/2017     Lab Results   Component Value Date    WBC 8.1 10/26/2020    HGB 13.4 (L) 10/26/2020    HCT 38.9 (L) 10/26/2020    MCV 89.6 10/26/2020     10/26/2020     No results found for: INR   No results found for: PSA   No results found for: [address]     Patient Active Problem List   Diagnosis    Adjustment disorder with anxiety    ADD (attention deficit disorder)    Controlled substance agreement signed    Vitamin D deficiency    Irritable bowel syndrome with diarrhea    Hematemesis    Heartburn    Esophagitis, [place] grade B    Gastritis and duodenitis    Diarrhea    Gastrointestinal tract imaging abnormality    Melena    Headache disorder       Allergies   Allergen Reactions    Amoxicillin Rash     Outpatient Medications Marked as Taking for the 3/25/21 encounter (Telemedicine) with Albina Valadez MD   Medication Sig Dispense Refill    Galcanezumab-gnlm (EMGALITY) 120 MG/ML SOAJ Inject 120 mg into the skin every 30 days 1 pen 0    topiramate ER (TROKENDI XR) 50 MG CP24 Take 50 mg by mouth daily 14 capsule 0    polyethylene glycol (MIRALAX) 17 GM/SCOOP POWD powder Take 238 g by mouth daily Take as directed for colonoscopy 255 g 0         Review of Systems: 14 systems were negative except of what was stated on HPI    Nursing note and vitals reviewed. There were no vitals filed for this visit. Wt Readings from Last 3 Encounters:   02/01/21 179 lb (81.2 kg)   11/13/20 174 lb (78.9 kg)   10/29/20 174 lb 9.6 oz (79.2 kg)     BP Readings from Last 3 Encounters:   02/01/21 132/86   11/13/20 123/88   11/13/20 (!) 96/59     There is no height or weight on file to calculate BMI. Constitutional: Patient appears well-developed and well-nourished. No distress. Head: Normocephalic and atraumatic. Skin: No rash or erythema. Psychiatric: Normal mood and affect.  Behavior is normal.       Assessment/Plan: Melissa Ferrara was seen today for headache.     Diagnoses and all orders for this visit:    Migraine without aura and with status migrainosus, not intractable  -     Galcanezumab-gnlm (EMGALITY) 120 MG/ML SOAJ; Inject 120 mg into the skin every 30 days  Start Ubrogepant (UBRELVY) 100 MG TABS; Take 100 mg by mouth daily as needed (headache)  Refer to Neurologist Dr. Dileep Torres [phone]      Return Keep Fasting Physical in Oct.

## 2021-04-02 ENCOUNTER — TELEPHONE (OUTPATIENT)
Dept: ADMINISTRATIVE | Age: 36
End: 2021-04-02

## 2021-04-13 ENCOUNTER — HOSPITAL ENCOUNTER (OUTPATIENT)
Dept: MRI IMAGING | Age: 36
Discharge: HOME | End: 2021-04-13
Payer: COMMERCIAL

## 2021-04-13 DIAGNOSIS — F32.A DEPRESSION, UNSPECIFIED DEPRESSION TYPE: ICD-10-CM

## 2021-04-13 DIAGNOSIS — H52.523: ICD-10-CM

## 2021-04-13 DIAGNOSIS — R41.0 CONFUSION: ICD-10-CM

## 2021-04-13 DIAGNOSIS — R51.9 NEW ONSET OF HEADACHES: ICD-10-CM

## 2021-04-13 PROCEDURE — 6360000004 HC RX CONTRAST MEDICATION: Performed by: PSYCHIATRY & NEUROLOGY

## 2021-04-13 PROCEDURE — 70553 MRI BRAIN STEM W/O & W/DYE: CPT

## 2021-04-13 PROCEDURE — A9579 GAD-BASE MR CONTRAST NOS,1ML: HCPCS | Performed by: PSYCHIATRY & NEUROLOGY

## 2021-04-13 RX ADMIN — GADOTERIDOL 15 ML: 279.3 INJECTION, SOLUTION INTRAVENOUS at 08:43

## 2021-04-22 ENCOUNTER — TELEPHONE (OUTPATIENT)
Dept: ADMINISTRATIVE | Age: 36
End: 2021-04-22

## 2021-04-23 NOTE — TELEPHONE ENCOUNTER
Received DENIAL for Ubrelvy 100MG tablets. See denial letter attached. Please notify patient. Thank you.

## 2021-04-26 NOTE — TELEPHONE ENCOUNTER
Inform pt to see if he used the coupon since I think he still can get it despite denial with coupon. If not, have neurologist get PA for his med since I'm no longer treating his headaches.

## 2021-06-15 NOTE — ED PROVIDER NOTES
AVS from 6/15/21    RTc I 6 months with labs    RV scheduled 12/14/21 @ 1:15pm    PT was given AVS and an appt schedule    Electronically signed by Renay Nguyen on 6/15/2021 at 2:58 PM Concern    None   Social History Narrative    None       SCREENINGS    [place] Coma Scale  Eye Opening: Spontaneous  Best Verbal Response: Oriented  Best Motor Response: Obeys commands  [place] Coma Scale Score: 15        PHYSICAL EXAM    (up to 7 for level 4, 8 or more for level 5)     ED Triage Vitals [06/10/20 2247]   BP Temp Temp Source Pulse Resp SpO2 Height Weight   105/70 98.2 °F (36.8 °C) Oral 65 16 100 % 5' 8\" (1.727 m) 165 lb (74.8 kg)       Physical Exam  Vitals signs and nursing note reviewed. Constitutional:       Appearance: He is well-developed. He is not ill-appearing or toxic-appearing. HENT:      Head: Normocephalic and atraumatic. Cardiovascular:      Pulses: Normal pulses. Pulmonary:      Effort: Pulmonary effort is normal.   Skin:     General: Skin is warm and dry. Capillary Refill: Capillary refill takes less than 2 seconds. Neurological:      Mental Status: He is alert and oriented to person, place, and time. Sensory: Sensation is intact. Motor: Motor function is intact. No abnormal muscle tone. Psychiatric:         Behavior: Behavior normal.         DIAGNOSTIC RESULTS   LABS:    Labs Reviewed - No data to display    All other labs were within normal range or not returned as of this dictation. EKG: All EKG's are interpreted by the Emergency Department Physician in the absence of a cardiologist.  Please see their note for interpretation of EKG. RADIOLOGY:   Non-plain film images such as CT, Ultrasound and MRI are read by the radiologist. Plain radiographic images are visualized andpreliminarily interpreted by the  ED Provider with the below findings:        Interpretation [facility] Radiologist below, if available at the time of this note:    No orders to display     No results found.         PROCEDURES   Unless otherwise noted below, none     Foreign Body  Date/Time: 6/10/2020 11:34 PM  Performed by: Brisa Andrea PA-C  Authorized by: Brisa Andrea MARTIN     Consent:     Consent obtained:  Verbal    Consent given by:  Patient    Risks discussed:  Bleeding, infection, pain, poor cosmetic result, incomplete removal and worsening of condition  Universal protocol:     Procedure explained and questions answered to patient or proxy's satisfaction: yes      Patient identity confirmed:  Verbally with patient  Location:     Location:  Leg    Leg location:  R lower leg    Depth: Intradermal    Tendon involvement:  None  Pre-procedure details:     Neurovascular status: intact      Preparation: Patient was prepped and draped in usual sterile fashion    Anesthesia (see MAR for exact dosages): Anesthesia method:  Local infiltration    Local anesthetic:  Lidocaine 1% WITH epi (2cc)  Procedure type:     Procedure complexity:  Simple  Procedure details:     Localization method:  Visualized    Removal mechanism: 18G needle. Foreign bodies recovered:  1    Description:  1 wood thorn    Intact foreign body removal: yes    Post-procedure details:     Neurovascular status: intact      Confirmation:  No additional foreign bodies on visualization    Skin closure:  None    Dressing:  Antibiotic ointment and adhesive bandage    Patient tolerance of procedure: Tolerated well, no immediate complications        CRITICAL CARE TIME   N/A    CONSULTS:  None      EMERGENCY DEPARTMENT COURSE and DIFFERENTIAL DIAGNOSIS/MDM:   Vitals:    Vitals:    06/10/20 2247   BP: 105/70   Pulse: 65   Resp: 16   Temp: 98.2 °F (36.8 °C)   TempSrc: Oral   SpO2: 100%   Weight: 165 lb (74.8 kg)   Height: 5' 8\" (1.727 m)       Patient was given thefollowing medications:  Medications - No data to display    11:40 PM EDT  Thorn foreign body was removed. Wound was cleaned with Hibiclens and irrigated with saline and syringe. Polysporin ointment and Band-Aid applied. Discussed wound care at home returning for worsening symptoms or signs of infection. He understands and agrees.     I estimate there is LOW

## 2021-11-10 ENCOUNTER — TELEMEDICINE (OUTPATIENT)
Dept: INTERNAL MEDICINE CLINIC | Age: 36
End: 2021-11-10
Payer: COMMERCIAL

## 2021-11-10 DIAGNOSIS — L29.9 GENERALIZED PRURITUS: Primary | ICD-10-CM

## 2021-11-10 PROCEDURE — 99212 OFFICE O/P EST SF 10 MIN: CPT | Performed by: INTERNAL MEDICINE

## 2021-11-10 PROCEDURE — G8427 DOCREV CUR MEDS BY ELIG CLIN: HCPCS | Performed by: INTERNAL MEDICINE

## 2021-11-10 NOTE — PROGRESS NOTES
Pursuant to the emergency declaration under the [facility] waiver authority and the [employer] and [employer] Act, this Virtual  Video Visit was conducted, with patient's consent, to reduce the patient's risk of exposure to COVID-19 and provide continuity of care. Service is  provided through a video synchronous discussion virtually to substitute for in-person clinic visit with the patient being at home and Dr. Castillo Form being at home. Patient consent to the video visit. Date of Service:  11/10/2021    Chief Complaint:      Chief Complaint   Patient presents with    Rash     patient believes he has jock itch       Assessment/Plan:    Shilpi Lee was seen today for rash. Diagnoses and all orders for this visit:    Generalized pruritus    Recommend moisturizing after showering to prevent dry skin. Pt to not shave his hair down too low which could cause some irritation and itching. Return Fasting Physical tomorrow 8 AM.      HPI:  Kevin Dawn is a 39 y.o. He complains of itching in suprapubic area and right groin but no rash for the past month. He's just been working, getting in and out of machines. He does not go to the GYM. He does shave his suprapubic area about a month ago. He does intermittently have itching around his trunk area especially if he's been sweating.     Lab Results   Component Value Date    LABMICR Not Indicated 10/26/2020     Lab Results   Component Value Date     (L) 10/26/2020    K 4.2 10/26/2020     10/26/2020    CO2 29 10/26/2020    BUN 18 10/26/2020    CREATININE 0.8 (L) 10/26/2020    GLUCOSE 80 10/26/2020    CALCIUM 9.7 10/26/2020     Lab Results   Component Value Date    CHOL 139 10/22/2019    TRIG 90 10/22/2019    HDL 43 10/22/2019    LDLCALC 78 10/22/2019     Lab Results   Component Value Date    ALT 25 10/26/2020    AST 34 10/26/2020     Lab Results   Component Value Date    TSH 3.30 10/16/2017    T4FREE 1.2 10/16/2017     Lab Results   Component Value Date    WBC 8.1 10/26/2020    HGB 13.4 (L) 10/26/2020    HCT 38.9 (L) 10/26/2020    MCV 89.6 10/26/2020     10/26/2020     No results found for: INR   No results found for: PSA   No results found for: LABURIC     Patient Active Problem List   Diagnosis    Adjustment disorder with anxiety    ADD (attention deficit disorder)    Controlled substance agreement signed    Vitamin D deficiency    Irritable bowel syndrome with diarrhea    Hematemesis    Heartburn    Esophagitis, Johnson grade B    Gastritis and duodenitis    Diarrhea    Gastrointestinal tract imaging abnormality    Melena    Headache disorder       Allergies   Allergen Reactions    Amoxicillin Rash     Outpatient Medications Marked as Taking for the 11/10/21 encounter (Telemedicine) with Madison Valadez MD   Medication Sig Dispense Refill    Galcanezumab-gnlm (EMGALITY) 120 MG/ML SOAJ Inject 120 mg into the skin every 30 days 1 pen 0    Ubrogepant (UBRELVY) 100 MG TABS Take 100 mg by mouth daily as needed (headache) 10 tablet 3    polyethylene glycol (MIRALAX) 17 GM/SCOOP POWD powder Take 238 g by mouth daily Take as directed for colonoscopy 255 g 0         Review of Systems: 14 systems were negative except of what was stated on HPI    Nursing note and vitals reviewed. There were no vitals filed for this visit. Wt Readings from Last 3 Encounters:   02/01/21 179 lb (81.2 kg)   11/13/20 174 lb (78.9 kg)   10/29/20 174 lb 9.6 oz (79.2 kg)     BP Readings from Last 3 Encounters:   02/01/21 132/86   11/13/20 123/88   11/13/20 (!) 96/59     There is no height or weight on file to calculate BMI. Constitutional: Patient appears well-developed and well-nourished. No distress. Head: Normocephalic and atraumatic. Skin: No rash or erythema in his suprapubic and right groin area. Hair is starting to grow back from a recent shave.   Psychiatric: Normal mood and affect.  Behavior is normal.

## 2021-11-11 ENCOUNTER — OFFICE VISIT (OUTPATIENT)
Dept: INTERNAL MEDICINE CLINIC | Age: 36
End: 2021-11-11
Payer: COMMERCIAL

## 2021-11-11 VITALS
HEART RATE: 74 BPM | BODY MASS INDEX: 24.86 KG/M2 | OXYGEN SATURATION: 99 % | DIASTOLIC BLOOD PRESSURE: 80 MMHG | SYSTOLIC BLOOD PRESSURE: 132 MMHG | HEIGHT: 68 IN | WEIGHT: 164 LBS

## 2021-11-11 DIAGNOSIS — Z11.59 NEED FOR HEPATITIS C SCREENING TEST: ICD-10-CM

## 2021-11-11 DIAGNOSIS — Z23 NEED FOR INFLUENZA VACCINATION: ICD-10-CM

## 2021-11-11 DIAGNOSIS — Z00.00 ENCOUNTER FOR WELL ADULT EXAM WITHOUT ABNORMAL FINDINGS: Primary | ICD-10-CM

## 2021-11-11 DIAGNOSIS — K21.9 GASTROESOPHAGEAL REFLUX DISEASE WITHOUT ESOPHAGITIS: ICD-10-CM

## 2021-11-11 LAB
A/G RATIO: 2 (ref 1.1–2.2)
ALBUMIN SERPL-MCNC: 4.6 G/DL (ref 3.4–5)
ALP BLD-CCNC: 123 U/L (ref 40–129)
ALT SERPL-CCNC: 35 U/L (ref 10–40)
ANION GAP SERPL CALCULATED.3IONS-SCNC: 14 MMOL/L (ref 3–16)
AST SERPL-CCNC: 30 U/L (ref 15–37)
BASOPHILS ABSOLUTE: 0 K/UL (ref 0–0.2)
BASOPHILS RELATIVE PERCENT: 0.6 %
BILIRUB SERPL-MCNC: <0.2 MG/DL (ref 0–1)
BUN BLDV-MCNC: 12 MG/DL (ref 7–20)
CALCIUM SERPL-MCNC: 9.6 MG/DL (ref 8.3–10.6)
CHLORIDE BLD-SCNC: 104 MMOL/L (ref 99–110)
CHOLESTEROL, TOTAL: 157 MG/DL (ref 0–199)
CO2: 23 MMOL/L (ref 21–32)
CREAT SERPL-MCNC: 0.7 MG/DL (ref 0.9–1.3)
EOSINOPHILS ABSOLUTE: 0.2 K/UL (ref 0–0.6)
EOSINOPHILS RELATIVE PERCENT: 2.3 %
GFR AFRICAN AMERICAN: >60
GFR NON-AFRICAN AMERICAN: >60
GLUCOSE BLD-MCNC: 93 MG/DL (ref 70–99)
HCT VFR BLD CALC: 48.7 % (ref 40.5–52.5)
HDLC SERPL-MCNC: 35 MG/DL (ref 40–60)
HEMOGLOBIN: 16.4 G/DL (ref 13.5–17.5)
HEPATITIS C ANTIBODY INTERPRETATION: NORMAL
LDL CHOLESTEROL CALCULATED: 94 MG/DL
LYMPHOCYTES ABSOLUTE: 2.4 K/UL (ref 1–5.1)
LYMPHOCYTES RELATIVE PERCENT: 31.3 %
MCH RBC QN AUTO: 30.6 PG (ref 26–34)
MCHC RBC AUTO-ENTMCNC: 33.6 G/DL (ref 31–36)
MCV RBC AUTO: 91 FL (ref 80–100)
MONOCYTES ABSOLUTE: 0.7 K/UL (ref 0–1.3)
MONOCYTES RELATIVE PERCENT: 9.4 %
NEUTROPHILS ABSOLUTE: 4.3 K/UL (ref 1.7–7.7)
NEUTROPHILS RELATIVE PERCENT: 56.4 %
PDW BLD-RTO: 13.7 % (ref 12.4–15.4)
PLATELET # BLD: 161 K/UL (ref 135–450)
PMV BLD AUTO: 10.6 FL (ref 5–10.5)
POTASSIUM SERPL-SCNC: 4.4 MMOL/L (ref 3.5–5.1)
RBC # BLD: 5.35 M/UL (ref 4.2–5.9)
SODIUM BLD-SCNC: 141 MMOL/L (ref 136–145)
TOTAL PROTEIN: 6.9 G/DL (ref 6.4–8.2)
TRIGL SERPL-MCNC: 141 MG/DL (ref 0–150)
VLDLC SERPL CALC-MCNC: 28 MG/DL
WBC # BLD: 7.6 K/UL (ref 4–11)

## 2021-11-11 PROCEDURE — 90471 IMMUNIZATION ADMIN: CPT | Performed by: INTERNAL MEDICINE

## 2021-11-11 PROCEDURE — 99395 PREV VISIT EST AGE 18-39: CPT | Performed by: INTERNAL MEDICINE

## 2021-11-11 PROCEDURE — 90686 IIV4 VACC NO PRSV 0.5 ML IM: CPT | Performed by: INTERNAL MEDICINE

## 2021-11-11 PROCEDURE — G8482 FLU IMMUNIZE ORDER/ADMIN: HCPCS | Performed by: INTERNAL MEDICINE

## 2021-11-11 PROCEDURE — 36415 COLL VENOUS BLD VENIPUNCTURE: CPT | Performed by: INTERNAL MEDICINE

## 2021-11-11 RX ORDER — PANTOPRAZOLE SODIUM 40 MG/1
40 TABLET, DELAYED RELEASE ORAL
Qty: 60 TABLET | Refills: 5 | Status: SHIPPED | OUTPATIENT
Start: 2021-11-11 | End: 2022-05-16 | Stop reason: SDUPTHER

## 2021-11-11 SDOH — ECONOMIC STABILITY: FOOD INSECURITY: WITHIN THE PAST 12 MONTHS, THE FOOD YOU BOUGHT JUST DIDN'T LAST AND YOU DIDN'T HAVE MONEY TO GET MORE.: NEVER TRUE

## 2021-11-11 SDOH — ECONOMIC STABILITY: FOOD INSECURITY: WITHIN THE PAST 12 MONTHS, YOU WORRIED THAT YOUR FOOD WOULD RUN OUT BEFORE YOU GOT MONEY TO BUY MORE.: NEVER TRUE

## 2021-11-11 ASSESSMENT — SOCIAL DETERMINANTS OF HEALTH (SDOH): HOW HARD IS IT FOR YOU TO PAY FOR THE VERY BASICS LIKE FOOD, HOUSING, MEDICAL CARE, AND HEATING?: NOT HARD AT ALL

## 2021-11-11 NOTE — PROGRESS NOTES
10/26/2020    HGB 13.4 (L) 10/26/2020    HCT 38.9 (L) 10/26/2020    MCV 89.6 10/26/2020     10/26/2020     No results found for: INR   No results found for: PSA   No results found for: LABURIC     Wt Readings from Last 3 Encounters:   11/11/21 164 lb (74.4 kg)   02/01/21 179 lb (81.2 kg)   11/13/20 174 lb (78.9 kg)     BP Readings from Last 3 Encounters:   11/11/21 132/80   02/01/21 132/86   11/13/20 123/88       Patient Active Problem List   Diagnosis    Adjustment disorder with anxiety    ADD (attention deficit disorder)    Controlled substance agreement signed    Vitamin D deficiency    Irritable bowel syndrome with diarrhea    Hematemesis    Heartburn    Esophagitis, [place] grade B    Gastritis and duodenitis    Diarrhea    Gastrointestinal tract imaging abnormality    Melena    Headache disorder       Allergies   Allergen Reactions    Amoxicillin Rash     No outpatient medications have been marked as taking for the 11/11/21 encounter (Office Visit) with Rc Tavera MD.       Past Medical History:   Diagnosis Date    ADD (attention deficit disorder) 11/17/2016    GERD (gastroesophageal reflux disease)     Headache disorder 2/1/2021    Irritable bowel syndrome with diarrhea 2/20/2020     Past Surgical History:   Procedure Laterality Date    COLONOSCOPY N/A 11/13/2020    COLONOSCOPY WITH BIOPSY performed by Gisela Bingham MD at [facility]  2013    UPPER GASTROINTESTINAL ENDOSCOPY N/A 11/13/2020    EGD BIOPSY performed by Gisela Bingham MD at [address] [place] [address]     Family History   Problem Relation Age of Onset    No Known Problems Mother     Heart Disease Father         2 heart attack     Heart Attack Father 36    No Known Problems Sister     No Known Problems Brother     Cancer Maternal Grandfather         colon cancer     Diabetes Paternal Grandmother     Cancer Paternal Grandmother         cervical cancer      Social History Socioeconomic History    Marital status: Single     Spouse name: Not on file    Number of children: Not on file    Years of education: Not on file    Highest education level: Not on file   Occupational History    Not on file   Tobacco Use    Smoking status: Current Every Day Smoker     Packs/day: 1.00     Years: 10.00     Pack years: 10.00     Types: Cigarettes    Smokeless tobacco: Never Used   Vaping Use    Vaping Use: Former    Substances: Always   Substance and Sexual Activity    Alcohol use: Yes     Comment: occasionally 1-2 per month    Drug use: No    Sexual activity: Yes     Partners: Female   Other Topics Concern    Not on file   Social History Narrative    Not on file     Social Determinants of Health     Financial Resource Strain: Low Risk     Difficulty of Paying Living Expenses: Not hard at all   Food Insecurity: No Food Insecurity    Worried About [address] [employer] in the Last Year: Never true    [facility] [employer] in the Last Year: Never true   Transportation Needs:     Lack of Transportation (Medical): Not on file    Lack of Transportation (Non-Medical):  Not on file   Physical Activity:     Days of Exercise per Week: Not on file    Minutes of Exercise per Session: Not on file   Stress:     Feeling of Stress : Not on file   Social Connections:     Frequency of Communication with Friends and Family: Not on file    Frequency of Social Gatherings with Friends and Family: Not on file    Attends Sabianism Services: Not on file    Active Member of Clubs or Organizations: Not on file    Attends Club or Organization Meetings: Not on file    Marital Status: Not on file   Intimate Partner Violence:     Fear of Current or Ex-Partner: Not on file    Emotionally Abused: Not on file    Physically Abused: Not on file    Sexually Abused: Not on file   Housing Stability:     Unable to Pay for Housing in the Last Year: Not on file    Number of [place] in the Last Year: Not on  Hepatitis B vaccine  Aged Out    Hib vaccine  Aged Out    Meningococcal (ACWY) vaccine  Aged Out    Varicella vaccine  Discontinued        Recommendations for [employer] Due: see orders and patient instructions/AVS.

## 2021-11-11 NOTE — PATIENT INSTRUCTIONS
Well Visit, Ages 25 to 48: Care Instructions  Overview     Well visits can help you stay healthy. Your doctor has checked your overall health and may have suggested ways to take good care of yourself. Your doctor also may have recommended tests. At home, you can help prevent illness with healthy eating, regular exercise, and other steps. Follow-up care is a key part of your treatment and safety. Be sure to make and go to all appointments, and call your doctor if you are having problems. It's also a good idea to know your test results and keep a list of the medicines you take. How can you care for yourself at home? · Get screening tests that you and your doctor decide on. Screening helps find diseases before any symptoms appear. · Eat healthy foods. Choose fruits, vegetables, whole grains, protein, and low-fat dairy foods. Limit fat, especially saturated fat. Reduce salt in your diet. · Limit alcohol. If you are a man, have no more than 2 drinks a day or 14 drinks a week. If you are a woman, have no more than 1 drink a day or 7 drinks a week. · Get at least 30 minutes of physical activity on most days of the week. Walking is a good choice. You also may want to do other activities, such as running, swimming, cycling, or playing tennis or team sports. Discuss any changes in your exercise program with your doctor. · Reach and stay at a healthy weight. This will lower your risk for many problems, such as obesity, diabetes, heart disease, and high blood pressure. · Do not smoke or allow others to smoke around you. If you need help quitting, talk to your doctor about stop-smoking programs and medicines. These can increase your chances of quitting for good. · Care for your mental health. It is easy to get weighed down by worry and stress. Learn strategies to manage stress, like deep breathing and mindfulness, and stay connected with your family and community.  If you find you often feel sad or hopeless, talk with your doctor. Treatment can help. · Talk to your doctor about whether you have any risk factors for sexually transmitted infections (STIs). You can help prevent STIs if you wait to have sex with a new partner (or partners) until you've each been tested for STIs. It also helps if you use condoms (male or female condoms) and if you limit your sex partners to one person who only has sex with you. Vaccines are available for some STIs, such as HPV. · Use birth control if it's important to you to prevent pregnancy. Talk with your doctor about the choices available and what might be best for you. · If you think you may have a problem with alcohol or drug use, talk to your doctor. This includes prescription medicines (such as amphetamines and opioids) and illegal drugs (such as cocaine and methamphetamine). Your doctor can help you figure out what type of treatment is best for you. · Protect your skin from too much sun. When you're outdoors from 10 a.m. to 4 p.m., stay in the shade or cover up with clothing and a hat with a wide brim. Wear sunglasses that block UV rays. Even when it's cloudy, put broad-spectrum sunscreen (SPF 30 or higher) on any exposed skin. · See a dentist one or two times a year for checkups and to have your teeth cleaned. · Wear a seat belt in the car. When should you call for help? Watch closely for changes in your health, and be sure to contact your doctor if you have any problems or symptoms that concern you. Where can you learn more? Go to [email or web address] org and sign in to your [employer] account. Enter P072 in the [facility] box to learn more about \"Well Visit, Ages 25 to 48: Care Instructions. \"     If you do not have an account, please click on the \"Sign Up Now\" link. Current as of: February 11, 2021               Content Version: 13.0  © 6219-7398 Healthwise, Incorporated. Care instructions adapted under license by [facility] ([facility]).  If you have questions about a medical condition or this instruction, always ask your healthcare professional. [place] [address] any warranty or liability for your use of this information.

## 2021-11-29 ENCOUNTER — TELEMEDICINE (OUTPATIENT)
Dept: INTERNAL MEDICINE CLINIC | Age: 36
End: 2021-11-29
Payer: COMMERCIAL

## 2021-11-29 DIAGNOSIS — U07.1 COVID-19 VIRUS INFECTION: Primary | ICD-10-CM

## 2021-11-29 PROCEDURE — G8427 DOCREV CUR MEDS BY ELIG CLIN: HCPCS | Performed by: INTERNAL MEDICINE

## 2021-11-29 PROCEDURE — 99212 OFFICE O/P EST SF 10 MIN: CPT | Performed by: INTERNAL MEDICINE

## 2021-11-29 NOTE — LETTER
[address], [facility] [place] [address]  Phone: [phone]  Fax: [phone]    Joi Real MD        November 29, 2021     Patient: Erich Benson   YOB: 1985   Date of Visit: 11/29/2021       To Whom It May Concern: It is my medical opinion that Erich Benson should be in quarantine until Dec 5, 2021 and can go back to work December 6, 2021. If you have any questions or concerns, please don't hesitate to call. Sincerely,    .         Joi Real MD

## 2021-11-29 NOTE — PROGRESS NOTES
Pursuant to the emergency declaration under the [facility] waiver authority and the [employer] and [employer] Act, this Virtual  Video Visit was conducted, with patient's consent, to reduce the patient's risk of exposure to COVID-19 and provide continuity of care. Service is  provided through a video synchronous discussion virtually to substitute for in-person clinic visit with the patient being at home and Dr. Melvina Guo being at home. Patient consent to the video visit. Date of Service:  11/29/2021    Chief Complaint:      Chief Complaint   Patient presents with    Positive For Covid-19       Assessment/Plan:    Jamey Gutierres was seen today for positive for covid-19. Diagnoses and all orders for this visit:    COVID-19 virus infection    If you're still having congestion, buy some over the counter Mucinex 1200 mg or Mucinex DM 1200 mg (if coughing) 1 pill twice a day  to thin up your secretions so it can drain to relieve pressure in your face/ears. Return if symptoms worsen or fail to improve. HPI:  Sarthak Loving is a 39 y.o. He complain of runny nose and cough last week and was positive for COVID 19 home test.  His girlfriend has been sick for couple of weeks.     Lab Results   Component Value Date    LABMICR Not Indicated 10/26/2020     Lab Results   Component Value Date     11/11/2021    K 4.4 11/11/2021     11/11/2021    CO2 23 11/11/2021    BUN 12 11/11/2021    CREATININE 0.7 (L) 11/11/2021    GLUCOSE 93 11/11/2021    CALCIUM 9.6 11/11/2021     Lab Results   Component Value Date    CHOL 157 11/11/2021    TRIG 141 11/11/2021    HDL 35 11/11/2021    LDLCALC 94 11/11/2021     Lab Results   Component Value Date    ALT 35 11/11/2021    AST 30 11/11/2021     Lab Results   Component Value Date    TSH 3.30 10/16/2017    T4FREE 1.2 10/16/2017     Lab Results   Component Value Date    WBC 7.6 11/11/2021 HGB 16.4 11/11/2021    HCT 48.7 11/11/2021    MCV 91.0 11/11/2021     11/11/2021     No results found for: INR   No results found for: PSA   No results found for: [facility]     Patient Active Problem List   Diagnosis    Adjustment disorder with anxiety    ADD (attention deficit disorder)    Controlled substance agreement signed    Vitamin D deficiency    Irritable bowel syndrome with diarrhea    Hematemesis    Heartburn    Esophagitis, [place] grade B    Gastritis and duodenitis    Diarrhea    Gastrointestinal tract imaging abnormality    Melena    Headache disorder       Allergies   Allergen Reactions    Amoxicillin Rash     Outpatient Medications Marked as Taking for the 11/29/21 encounter (Telemedicine) with Martín Valadez MD   Medication Sig Dispense Refill    pantoprazole (PROTONIX) 40 MG tablet Take 1 tablet by mouth 2 times daily (before meals) 60 tablet 5         Review of Systems: 14 systems were negative except of what was stated on HPI    Nursing note and vitals reviewed. There were no vitals filed for this visit. Wt Readings from Last 3 Encounters:   11/11/21 164 lb (74.4 kg)   02/01/21 179 lb (81.2 kg)   11/13/20 174 lb (78.9 kg)     BP Readings from Last 3 Encounters:   11/11/21 132/80   02/01/21 132/86   11/13/20 123/88     There is no height or weight on file to calculate BMI. Constitutional: Patient appears well-developed and well-nourished. No distress. Head: Normocephalic and atraumatic. Skin: No rash or erythema. Psychiatric: Normal mood and affect.  Behavior is normal.

## 2022-02-02 ENCOUNTER — OFFICE VISIT (OUTPATIENT)
Dept: INTERNAL MEDICINE CLINIC | Age: 37
End: 2022-02-02
Payer: COMMERCIAL

## 2022-02-02 VITALS
HEART RATE: 71 BPM | HEIGHT: 68 IN | SYSTOLIC BLOOD PRESSURE: 126 MMHG | OXYGEN SATURATION: 98 % | BODY MASS INDEX: 25.31 KG/M2 | DIASTOLIC BLOOD PRESSURE: 84 MMHG | WEIGHT: 167 LBS

## 2022-02-02 DIAGNOSIS — Z79.899 CONTROLLED SUBSTANCE AGREEMENT SIGNED: ICD-10-CM

## 2022-02-02 DIAGNOSIS — F90.2 ATTENTION DEFICIT HYPERACTIVITY DISORDER (ADHD), COMBINED TYPE: Primary | ICD-10-CM

## 2022-02-02 PROCEDURE — G8419 CALC BMI OUT NRM PARAM NOF/U: HCPCS | Performed by: INTERNAL MEDICINE

## 2022-02-02 PROCEDURE — 4004F PT TOBACCO SCREEN RCVD TLK: CPT | Performed by: INTERNAL MEDICINE

## 2022-02-02 PROCEDURE — G8427 DOCREV CUR MEDS BY ELIG CLIN: HCPCS | Performed by: INTERNAL MEDICINE

## 2022-02-02 PROCEDURE — G8482 FLU IMMUNIZE ORDER/ADMIN: HCPCS | Performed by: INTERNAL MEDICINE

## 2022-02-02 PROCEDURE — 99213 OFFICE O/P EST LOW 20 MIN: CPT | Performed by: INTERNAL MEDICINE

## 2022-02-02 ASSESSMENT — PATIENT HEALTH QUESTIONNAIRE - PHQ9
SUM OF ALL RESPONSES TO PHQ QUESTIONS 1-9: 0
SUM OF ALL RESPONSES TO PHQ9 QUESTIONS 1 & 2: 0
SUM OF ALL RESPONSES TO PHQ QUESTIONS 1-9: 0
2. FEELING DOWN, DEPRESSED OR HOPELESS: 0
1. LITTLE INTEREST OR PLEASURE IN DOING THINGS: 0

## 2022-02-02 NOTE — PROGRESS NOTES
Brooklynn Gao  YOB: 1985    Date of Service:  2/2/2022    Chief Complaint:      Chief Complaint   Patient presents with    ADHD     wants to discuss trouble focusing       Assessment/Plan:  Temi Matthews was seen today for adhd. Diagnoses and all orders for this visit:    Attention deficit hyperactivity disorder (ADHD), combined type  Start  lisdexamfetamine (VYVANSE) 30 MG capsule; Take 1 capsule by mouth every morning for 30 days. Controlled substance agreement signed  Start  lisdexamfetamine (VYVANSE) 30 MG capsule; Take 1 capsule by mouth every morning for 30 days. Return VV March 1 at 10:50 ADD. HPI:  Brooklynn Gao is a 39 y.o. He complain of focusing at work and not remember what he needs to do. He also can't remember things he needs to do at home for the past few months. He's always had problems even in school, a \"C\" student. He can't sit still throughout a movie. He has difficulty focusing during conversations especially at work when someone tells him there's something wrong but he can't focus to hear what they're saying. He's a  and able to function ok.      Lab Results   Component Value Date    LABMICR Not Indicated 10/26/2020     Lab Results   Component Value Date     11/11/2021    K 4.4 11/11/2021     11/11/2021    CO2 23 11/11/2021    BUN 12 11/11/2021    CREATININE 0.7 (L) 11/11/2021    GLUCOSE 93 11/11/2021    CALCIUM 9.6 11/11/2021     Lab Results   Component Value Date    CHOL 157 11/11/2021    TRIG 141 11/11/2021    HDL 35 11/11/2021    LDLCALC 94 11/11/2021     Lab Results   Component Value Date    ALT 35 11/11/2021    AST 30 11/11/2021     Lab Results   Component Value Date    TSH 3.30 10/16/2017    T4FREE 1.2 10/16/2017     Lab Results   Component Value Date    WBC 7.6 11/11/2021    HGB 16.4 11/11/2021    HCT 48.7 11/11/2021    MCV 91.0 11/11/2021     11/11/2021     No results found for: INR   No results found for: PSA   No results found for: [facility]     Patient Active Problem List   Diagnosis    Adjustment disorder with anxiety    ADD (attention deficit disorder)    Controlled substance agreement signed    Vitamin D deficiency    Irritable bowel syndrome with diarrhea    Hematemesis    Heartburn    Esophagitis, [place] grade B    Gastritis and duodenitis    Diarrhea    Gastrointestinal tract imaging abnormality    Melena    Headache disorder       Allergies   Allergen Reactions    Amoxicillin Rash     No outpatient medications have been marked as taking for the 2/2/22 encounter (Office Visit) with Ramon Connelly MD.         Review of Systems: 14 systems were negative except of what was stated on HPI    Nursing note and vitals reviewed. Vitals:    02/02/22 1055   BP: 126/84   Pulse: 71   SpO2: 98%   Weight: 167 lb (75.8 kg)   Height: 5' 8\" (1.727 m)     Wt Readings from Last 3 Encounters:   02/02/22 167 lb (75.8 kg)   11/11/21 164 lb (74.4 kg)   02/01/21 179 lb (81.2 kg)     BP Readings from Last 3 Encounters:   02/02/22 126/84   11/11/21 132/80   02/01/21 132/86     Body mass index is 25.39 kg/m². Constitutional: Patient appears well-developed and well-nourished. No distress. Head: Normocephalic and atraumatic. Neck: Normal range of motion. Neck supple. No thyroidmegaly. Cardiovascular: Normal rate, regular rhythm, normal heart sounds and intact distal pulses. Pulmonary/Chest: Effort normal and breath sounds normal. No stridor. No respiratory distress. No wheezes and no rales. Abdominal: Soft. Bowel sounds are normal. No distension and no mass. No tenderness. No rebound and no guarding. Musculoskeletal: No edema and no tenderness. Skin: No rash or erythema.

## 2022-02-02 NOTE — LETTER
CONTROLLED SUBSTANCE MEDICATION AGREEMENT     Patient Name: Jona Carver  Patient YOB: 1985   I understand, that controlled substance medications may be used to help better manage my symptoms and to improve my ability to function at home, work and in social settings. However, I also understand that these medications do have risks, which have been discussed with me, including possible development of physical or psychological dependence. I understand that successful treatment requires mutual trust and honesty between me and my provider. I understand and agree that following this Medication Agreement is necessary in continuing my provider-patient relationship and the success of my treatment plan. Explanation from my Provider: Benefits and Goals of Controlled Substance Medications: There are two potential goals for your treatment: (1) decreased pain and suffering (2) improved daily life functions. There are many possible treatments for your chronic condition(s). Alternatives such as physical therapy, yoga, massage, home daily exercise, meditation, relaxation techniques, injections, chiropractic manipulations, surgery, cognitive therapy, hypnosis and many medications that are not habit-forming may be used. Use of controlled substance medications may be helpful, but they are unlikely to resolve all symptoms or restore all function. Explanation from my Provider: Risks of Controlled Substance Medications:  Opioid pain medications: These medications can lead to problems such as addiction/dependence, sedation, lightheadedness/dizziness, memory issues, falls, constipation, nausea, or vomiting. They may also impair the ability to drive or operate machinery. Additionally, these medications may lower testosterone levels, leading to loss of bone strength, stamina and sex drive.   They may cause problems with breathing, sleep apnea and reduced coughing, which is especially dangerous for these medications are combined with other stimulants, such as caffeine pills or energy drinks, certain weight loss supplements and oral decongestants. Dependence withdrawal symptoms may include depressed mood, loss of interest, suicidal thoughts, anxiety, fatigue, appetite changes and agitation. Testosterone replacement therapy:  Potential side effects include increased risk of stroke and heart attack, blood clots, increased blood pressure, increased cholesterol, enlarged prostate, sleep apnea, irritability/aggression and other mood disorders, and decreased fertility. I agree and understand that I and my prescriber have the following rights and responsibilities regarding my treatment plan:     1. MY RIGHTS:  To be informed of my treatment and medication plan. To be an active participant in my health and wellbeing. 2. MY RESPONSIBILITY AND UNDERSTANDING FOR USE OF MEDICATIONS   I will take medications at the dose and frequency as directed. For my safety, I will not increase or change how I take my medications without the recommendation of my healthcare provider.  I will actively participate in any program recommended by my provider which may improve function, including social, physical, psychological programs.  I will not take my medications with alcohol or other drugs not prescribed to me. I understand that drinking alcohol with my medications increases the chances of side effects, including reduced breathing rate and could lead to personal injury when operating machinery.  I understand that if I have a history of substance use disorders, including alcohol or other illicit drugs, that I may be at increased risk of addiction to my medications.  I agree to notify my provider immediately if I should become pregnant so that my treatment plan can be adjusted.    I agree and understand that I shall only receive controlled substance medications from the prescriber that signed this agreement unless there is written agreement among other prescribers of controlled substances outlining the responsibility of the medications being prescribed.  I understand that the if the controlled medication is not helping to achieve goals, the dosage may be tapered and no longer prescribed. 3. MY RESPONSIBILITY FOR COMMUNICATION / PRESCRIPTION RENEWALS   I agree that all controlled substance medications that I take will be prescribed only by my provider. If another healthcare provider prescribes me medication in an emergency, I will notify my provider within seventy-two (72) hours.  I will arrange for refills at the prescribed interval ONLY during regular office hours. I will not ask for refills earlier than agreed, after-hours, on holidays or weekends. Refills may take up to 72 hours for processing and prescriptions to reach the pharmacy.  I will inform my other health care providers that I am taking these medications and of the existence of this [id number]. In the event of an emergency, I will provide the same information to the emergency department prescribers.  I will keep my provider updated on the pharmacy I am using for controlled medication prescription filling. Initials:_______  4. MY RESPONSIBILITY FOR PROTECTING MEDICATIONS   I will protect my prescriptions and medications. I understand that lost or misplaced prescriptions will not be replaced.  I will keep medications only for my own use and will not share them with others. I will keep all medications away from children.  I agree that if my medications are adjusted or discontinued, I will properly dispose of any remaining medications. I understand that I will be required to dispose of any remaining controlled medications as, directed by my prescriber, prior to being provided with any prescriptions for other controlled medications.   Medication drop box locations can be found at: [email or web address]    5. MY RESPONSIBILITY WITH ILLEGAL DRUGS    I will not use illegal or street drugs or another person's prescription medications not prescribed to me.  If there are identified addiction type symptoms, then referral to a program may be provided by my provider and I agree to follow through with this recommendation. 6. MY RESPONSIBILITY FOR COOPERATION WITH INVESTIGATIONS   I understand that my provider will comply with any applicable law and may discuss my use and/or possible misuse/abuse of controlled substances and alcohol, as appropriate, with any health care provider involved in my care, pharmacist, or legal authority.  I authorize my provider and pharmacy to cooperate fully with law enforcement agencies (as permitted by law) in the investigation of any possible misuse, sale, or other diversion of my controlled substances.  I agree to waive any applicable privilege or right of privacy or confidentiality with respect to these authorizations. 7. PROVIDERS RIGHT TO MONITOR FOR SAFETY: PRESCRIPTION MONITORING / DRUG TESTING   I consent to drug/toxicology screening and will submit to a drug screen upon my providers request to assure I am only taking the prescribed drugs for my safety monitoring. I understand that a drug screen is a laboratory test in which a sample of my urine, blood or saliva is checked to see what drugs I have been taking. This may entail an observed urine specimen, which means that a nurse or other health care provider may watch me provide urine, and I will cooperate if I am asked to provide an observed specimen.  I understand that my provider will check a copy of my State Prescription Monitoring Program () Report in order to safely prescribe medications.  Pill Counts: I consent to pill counts when requested.   I may be asked to bring all my prescribed controlled substance medications, in their original bottles, to all of my scheduled appointments. In addition, my provider may ask me to come to the practice at any time for a random pill count. 8. TERMINATION OF THIS AGREEMENT  For my safety, my prescriber has the right to stop prescribing controlled substance medications and may end this agreement. Initials:_______   Conditions that may result in termination of this agreement:  a. I do not show any improvement in pain, or my activity has not improved. b. I develop rapid tolerance or loss of improvement, as described in my treatment plan.  c. I develop significant side effects from the medication. d. My behavior is not consistent with the responsibilities outlined above, thereby causing safety concerns to continue prescribing controlled substance medications. e. I fail to follow the terms of this agreement. f. Other:____________________________       UNDERSTANDING THIS MEDICATION AGREEMENT:    I have read the above and have had all my questions answered. For chronic disease management, I know that my symptoms can be managed with many types of treatments. A chronic medication trial may be part of my treatment, but I must be an active participant in my care. Medication therapy is only one part of my symptom management plan. In some cases, there may be limited scientific evidence to support the chronic use of certain medications to improve symptoms and daily function. Furthermore, in certain circumstances, there may be scientific information that suggests that the use of chronic controlled substances may worsen my symptoms and increase my risk of unintentional death directly related to this medication therapy. I know that if my provider feels my risk from controlled medications is greater than my benefit, I will have my controlled substance medication(s) compassionately lowered or removed altogether.      I further agree to allow this office to contact my HIPAA contact if there are concerns about my safety and use of the controlled medications. I have agreed to use the prescribed controlled substance medications to me as instructed by my provider and as stated in this Medication Agreement. My initial on each page and my signature below shows that I have read each page and I have had the opportunity to ask questions with answers provided by my provider.     Patient Name (Printed): _____________________________________      Patient Signature:  ______________________   Date: _____________    Prescriber Name (Printed): Blanche Castaneda MD  Prescriber Signature:   Date: 2/2/2022

## 2022-02-07 ENCOUNTER — OFFICE VISIT (OUTPATIENT)
Dept: INTERNAL MEDICINE CLINIC | Age: 37
End: 2022-02-07
Payer: COMMERCIAL

## 2022-02-07 VITALS
DIASTOLIC BLOOD PRESSURE: 70 MMHG | HEIGHT: 68 IN | BODY MASS INDEX: 25.46 KG/M2 | SYSTOLIC BLOOD PRESSURE: 130 MMHG | OXYGEN SATURATION: 98 % | WEIGHT: 168 LBS | HEART RATE: 81 BPM

## 2022-02-07 DIAGNOSIS — S68.119S FINGER AMPUTATION, TRAUMATIC, SEQUELA (HCC): Primary | ICD-10-CM

## 2022-02-07 PROCEDURE — 99212 OFFICE O/P EST SF 10 MIN: CPT | Performed by: INTERNAL MEDICINE

## 2022-02-07 PROCEDURE — G8419 CALC BMI OUT NRM PARAM NOF/U: HCPCS | Performed by: INTERNAL MEDICINE

## 2022-02-07 PROCEDURE — 4004F PT TOBACCO SCREEN RCVD TLK: CPT | Performed by: INTERNAL MEDICINE

## 2022-02-07 PROCEDURE — G8482 FLU IMMUNIZE ORDER/ADMIN: HCPCS | Performed by: INTERNAL MEDICINE

## 2022-02-07 PROCEDURE — G8427 DOCREV CUR MEDS BY ELIG CLIN: HCPCS | Performed by: INTERNAL MEDICINE

## 2022-02-07 NOTE — LETTER
[address] [address]  Phone: [phone]  Fax: [phone]    Genia Wright MD        February 7, 2022     Patient: Valeri Lomas   YOB: 1985   Date of Visit: 2/7/2022       To Whom It May Concern: It is my medical opinion that Valeri Lomas should be off work 2/9/2022 and return to work Thursday 2/10/2022. If you have any questions or concerns, please don't hesitate to call. Sincerely,    .       Genia Wright MD

## 2022-02-07 NOTE — PROGRESS NOTES
[address]  YOB: 1985    Date of Service:  2/7/2022    Chief Complaint:      Chief Complaint   Patient presents with    ED Follow-up     02/03/2022- amputation to two fingers on left hand        Assessment/Plan:  Oren Mariscal was seen today for ed follow-up. Diagnoses and all orders for this visit:    Finger amputation, traumatic, sequela ([address]    Letter off work 2/9/22 until he sees a surgeon. Wear cotton glove over left hand if going to work in a  dirty area    Return if symptoms worsen or fail to improve. 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 He was using a table saw and it dropped and cut off left 2nd finger cut off completely, 3rd to knuckle and 4th tip of finger on 2/3/22. He has an appointment with surgeon 2/9/2022. He's suppose to go back to work. 2/9/22 and would like to hold off until he sees the surgeon. Currently no pain. 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 He has dissovlable stiches in.     Lab Results   Component Value Date    LABMICR Not Indicated 10/26/2020     Lab Results   Component Value Date     11/11/2021    K 4.4 11/11/2021     11/11/2021    CO2 23 11/11/2021    BUN 12 11/11/2021    CREATININE 0.7 (L) 11/11/2021    GLUCOSE 93 11/11/2021    CALCIUM 9.6 11/11/2021     Lab Results   Component Value Date    CHOL 157 11/11/2021    TRIG 141 11/11/2021    HDL 35 11/11/2021    LDLCALC 94 11/11/2021     Lab Results   Component Value Date    ALT 35 11/11/2021    AST 30 11/11/2021     Lab Results   Component Value Date    TSH 3.30 10/16/2017    T4FREE 1.2 10/16/2017     Lab Results   Component Value Date    WBC 7.6 11/11/2021    HGB 16.4 11/11/2021    HCT 48.7 11/11/2021    MCV 91.0 11/11/2021     11/11/2021     No results found for: INR   No results found for: PSA   No results found for: [address]     Patient Active Problem List   Diagnosis    Adjustment disorder with anxiety   

## 2022-03-01 ENCOUNTER — TELEMEDICINE (OUTPATIENT)
Dept: INTERNAL MEDICINE CLINIC | Age: 37
End: 2022-03-01
Payer: COMMERCIAL

## 2022-03-01 DIAGNOSIS — F90.2 ATTENTION DEFICIT HYPERACTIVITY DISORDER (ADHD), COMBINED TYPE: Primary | ICD-10-CM

## 2022-03-01 DIAGNOSIS — S68.119S FINGER AMPUTATION, TRAUMATIC, SEQUELA (HCC): ICD-10-CM

## 2022-03-01 DIAGNOSIS — K21.9 GASTROESOPHAGEAL REFLUX DISEASE WITHOUT ESOPHAGITIS: ICD-10-CM

## 2022-03-01 DIAGNOSIS — Z79.899 CONTROLLED SUBSTANCE AGREEMENT SIGNED: ICD-10-CM

## 2022-03-01 PROCEDURE — 99214 OFFICE O/P EST MOD 30 MIN: CPT | Performed by: INTERNAL MEDICINE

## 2022-03-01 PROCEDURE — G8427 DOCREV CUR MEDS BY ELIG CLIN: HCPCS | Performed by: INTERNAL MEDICINE

## 2022-03-01 NOTE — PROGRESS NOTES
Pursuant to the emergency declaration under the [facility] waiver authority and the [employer] and [employer] Act, this Virtual  Video Visit was conducted, with patient's consent, to reduce the patient's risk of exposure to COVID-19 and provide continuity of care. Service is  provided through a video synchronous discussion virtually to substitute for in-person clinic visit with the patient being at home and Dr. Noreen Brody being at home. Patient consent to the video visit. Date of Service:  3/1/2022    Chief Complaint:      Chief Complaint   Patient presents with    ADHD       Assessment/Plan:    Steffany Rm was seen today for adhd. Diagnoses and all orders for this visit:    Attention deficit hyperactivity disorder (ADHD), combined type  -     lisdexamfetamine (VYVANSE) 30 MG capsule; Take 1 capsule by mouth every morning for 30 days. -     lisdexamfetamine (VYVANSE) 30 MG capsule; Take 1 capsule by mouth every morning for 30 days. -     lisdexamfetamine (VYVANSE) 30 MG capsule; Take 1 capsule by mouth every morning for 30 days. Controlled substance agreement signed  -     lisdexamfetamine (VYVANSE) 30 MG capsule; Take 1 capsule by mouth every morning for 30 days. -     lisdexamfetamine (VYVANSE) 30 MG capsule; Take 1 capsule by mouth every morning for 30 days. -     lisdexamfetamine (VYVANSE) 30 MG capsule; Take 1 capsule by mouth every morning for 30 days. Gastroesophageal reflux disease without esophagitis    Finger amputation, traumatic, sequela ([facility] [address]    [facility] paperwork filled out    Return VV June 14 at 10:40 ADD and Cancel 5/10 f/u. HPI:  Nella Ceron is a 39 y.o. He needs [place] paper work to start on 2/11/22 and will need to take off 1-2 times a month to see the surgeon and trying on prosthetics for his recent accidental amputation of his left fingers.     ADD:  Much improved on Vyvanse 30 mg daily and able to focusing at work and remember what he needs to do. He's able to focus more during conversations especially at work. .  He's a  and able to function ok. GERD:  stable back on Protonix 40 mg bid. He did have symptoms when taking qd. Lab Results   Component Value Date    LABMICR Not Indicated 10/26/2020     Lab Results   Component Value Date     11/11/2021    K 4.4 11/11/2021     11/11/2021    CO2 23 11/11/2021    BUN 12 11/11/2021    CREATININE 0.7 (L) 11/11/2021    GLUCOSE 93 11/11/2021    CALCIUM 9.6 11/11/2021     Lab Results   Component Value Date    CHOL 157 11/11/2021    TRIG 141 11/11/2021    HDL 35 11/11/2021    LDLCALC 94 11/11/2021     Lab Results   Component Value Date    ALT 35 11/11/2021    AST 30 11/11/2021     Lab Results   Component Value Date    TSH 3.30 10/16/2017    T4FREE 1.2 10/16/2017     Lab Results   Component Value Date    WBC 7.6 11/11/2021    HGB 16.4 11/11/2021    HCT 48.7 11/11/2021    MCV 91.0 11/11/2021     11/11/2021     No results found for: INR   No results found for: PSA   No results found for: [address]     Patient Active Problem List   Diagnosis    Adjustment disorder with anxiety    Attention deficit hyperactivity disorder (ADHD), combined type    Controlled substance agreement signed    Vitamin D deficiency    Irritable bowel syndrome with diarrhea    Hematemesis    Heartburn    Esophagitis, [place] grade B    Gastritis and duodenitis    Diarrhea    Gastrointestinal tract imaging abnormality    Melena    Headache disorder       Allergies   Allergen Reactions    Amoxicillin Rash     Outpatient Medications Marked as Taking for the 3/1/22 encounter (Telemedicine) with Inna Valadez MD   Medication Sig Dispense Refill    lisdexamfetamine (VYVANSE) 30 MG capsule Take 1 capsule by mouth every morning for 30 days.  30 capsule 0    pantoprazole (PROTONIX) 40 MG tablet Take 1 tablet by mouth 2 times daily (before meals) 60 tablet 5         Review of Systems: 14 systems were negative except of what was stated on HPI    Nursing note and vitals reviewed. There were no vitals filed for this visit. Wt Readings from Last 3 Encounters:   02/07/22 168 lb (76.2 kg)   02/02/22 167 lb (75.8 kg)   11/11/21 164 lb (74.4 kg)     BP Readings from Last 3 Encounters:   02/07/22 130/70   02/02/22 126/84   11/11/21 132/80     There is no height or weight on file to calculate BMI. Constitutional: Patient appears well-developed and well-nourished. No distress. Head: Normocephalic and atraumatic. Psychiatric: Normal mood and affect.  Behavior is normal.

## 2022-04-19 ENCOUNTER — TELEPHONE (OUTPATIENT)
Dept: INTERNAL MEDICINE CLINIC | Age: 37
End: 2022-04-19

## 2022-04-19 DIAGNOSIS — Z79.899 CONTROLLED SUBSTANCE AGREEMENT SIGNED: ICD-10-CM

## 2022-04-19 DIAGNOSIS — F90.2 ATTENTION DEFICIT HYPERACTIVITY DISORDER (ADHD), COMBINED TYPE: Primary | ICD-10-CM

## 2022-04-19 RX ORDER — DEXTROAMPHETAMINE SACCHARATE, AMPHETAMINE ASPARTATE, DEXTROAMPHETAMINE SULFATE AND AMPHETAMINE SULFATE 3.75; 3.75; 3.75; 3.75 MG/1; MG/1; MG/1; MG/1
15 TABLET ORAL 2 TIMES DAILY
Qty: 60 TABLET | Refills: 0 | Status: SHIPPED | OUTPATIENT
Start: 2022-04-19 | End: 2022-05-19

## 2022-04-19 NOTE — TELEPHONE ENCOUNTER
Inform patient generic Adderall sent and he can take up to twice a day as needed since it's shorter acting.   Have him move his follow up to [facility] 30 days to see how he's doing on new medication

## 2022-04-19 NOTE — TELEPHONE ENCOUNTER
Patient went to  refill of Vyvanse and it was $160 dollars. He states that he can't afford this and wants to see if he can be changed to a different medication. He was informed that you are out of the office this week. Please advise.

## 2022-05-12 ENCOUNTER — TELEPHONE (OUTPATIENT)
Dept: INTERNAL MEDICINE CLINIC | Age: 37
End: 2022-05-12

## 2022-05-12 NOTE — TELEPHONE ENCOUNTER
Patient calling to ask if we had received (by fax)  new forms for his FMLA yesterday. We checked, and did not receive anything. However, we do have a blank copy of the old forms. He stated that there are some changes to be made on the form. I returned a call to him and left a message on voicemail. If new forms or changes to the form needed to be made, it will most likely require an appointment. VV or in office. Asked for a return call.

## 2022-05-16 ENCOUNTER — TELEMEDICINE (OUTPATIENT)
Dept: INTERNAL MEDICINE CLINIC | Age: 37
End: 2022-05-16
Payer: COMMERCIAL

## 2022-05-16 DIAGNOSIS — Z79.899 CONTROLLED SUBSTANCE AGREEMENT SIGNED: ICD-10-CM

## 2022-05-16 DIAGNOSIS — F90.2 ATTENTION DEFICIT HYPERACTIVITY DISORDER (ADHD), COMBINED TYPE: ICD-10-CM

## 2022-05-16 DIAGNOSIS — K21.9 GASTROESOPHAGEAL REFLUX DISEASE WITHOUT ESOPHAGITIS: ICD-10-CM

## 2022-05-16 PROCEDURE — 99213 OFFICE O/P EST LOW 20 MIN: CPT | Performed by: INTERNAL MEDICINE

## 2022-05-16 PROCEDURE — G8427 DOCREV CUR MEDS BY ELIG CLIN: HCPCS | Performed by: INTERNAL MEDICINE

## 2022-05-16 RX ORDER — DEXTROAMPHETAMINE SACCHARATE, AMPHETAMINE ASPARTATE, DEXTROAMPHETAMINE SULFATE AND AMPHETAMINE SULFATE 3.75; 3.75; 3.75; 3.75 MG/1; MG/1; MG/1; MG/1
15 TABLET ORAL 2 TIMES DAILY
Qty: 60 TABLET | Refills: 0 | Status: SHIPPED | OUTPATIENT
Start: 2022-08-01 | End: 2022-08-31

## 2022-05-16 RX ORDER — DEXTROAMPHETAMINE SACCHARATE, AMPHETAMINE ASPARTATE, DEXTROAMPHETAMINE SULFATE AND AMPHETAMINE SULFATE 3.75; 3.75; 3.75; 3.75 MG/1; MG/1; MG/1; MG/1
15 TABLET ORAL 2 TIMES DAILY
Qty: 60 TABLET | Refills: 0 | Status: SHIPPED | OUTPATIENT
Start: 2022-06-01 | End: 2022-07-01

## 2022-05-16 RX ORDER — PANTOPRAZOLE SODIUM 40 MG/1
40 TABLET, DELAYED RELEASE ORAL
Qty: 60 TABLET | Refills: 5 | Status: SHIPPED | OUTPATIENT
Start: 2022-05-16 | End: 2022-06-15

## 2022-05-16 NOTE — PROGRESS NOTES
Pursuant to the emergency declaration under the [facility] waiver authority and the [employer] Act, this Virtual  Video Visit was conducted, with patient's consent, to reduce the patient's risk of exposure to COVID-19 and provide continuity of care. Service is  provided through a video synchronous discussion virtually to substitute for in-person clinic visit with the patient being at home and Dr. Anyi Heath being at home. Patient consent to the video visit. Date of Service:  5/16/2022    Chief Complaint:      Chief Complaint   Patient presents with    ADHD    Forms     update [facility] forms       Assessment/Plan:    Marcio Boland was seen today for adhd and forms. Diagnoses and all orders for this visit:    Attention deficit hyperactivity disorder (ADHD), combined type  -     amphetamine-dextroamphetamine (ADDERALL, 15MG,) 15 MG tablet; Take 1 tablet by mouth 2 times daily for 30 days. -     amphetamine-dextroamphetamine (ADDERALL, 15MG,) 15 MG tablet; Take 1 tablet by mouth 2 times daily for 30 days. Controlled substance agreement signed  -     amphetamine-dextroamphetamine (ADDERALL, 15MG,) 15 MG tablet; Take 1 tablet by mouth 2 times daily for 30 days. -     amphetamine-dextroamphetamine (ADDERALL, 15MG,) 15 MG tablet; Take 1 tablet by mouth 2 times daily for 30 days. Gastroesophageal reflux disease without esophagitis  -     pantoprazole (PROTONIX) 40 MG tablet; Take 1 tablet by mouth 2 times daily (before meals)    Stable and continue on current medications. [place] filled [place]. Return Oct 26 at 10:30 Fasting Physical.      HPI:  Nina Mancia is a 39 y.o. Needs [place] paperwork filled out from when he had his fingers accidentally amputated on 2/3/22    ADD:  stable on Adderall 15 mg daily with occ twice a day 3-4 days a week.  He's able to focus more during conversations especially at work..  He's a  and able to function ok.      GERD:  stable back on Protonix 40 mg bid. He did have symptoms when taking qd. Lab Results   Component Value Date    LABMICR Not Indicated 10/26/2020     Lab Results   Component Value Date     11/11/2021    K 4.4 11/11/2021     11/11/2021    CO2 23 11/11/2021    BUN 12 11/11/2021    CREATININE 0.7 (L) 11/11/2021    GLUCOSE 93 11/11/2021    CALCIUM 9.6 11/11/2021     Lab Results   Component Value Date    CHOL 157 11/11/2021    TRIG 141 11/11/2021    HDL 35 11/11/2021    LDLCALC 94 11/11/2021     Lab Results   Component Value Date    ALT 35 11/11/2021    AST 30 11/11/2021     Lab Results   Component Value Date    TSH 3.30 10/16/2017    T4FREE 1.2 10/16/2017     Lab Results   Component Value Date    WBC 7.6 11/11/2021    HGB 16.4 11/11/2021    HCT 48.7 11/11/2021    MCV 91.0 11/11/2021     11/11/2021     No results found for: INR   No results found for: PSA   No results found for: [facility]     Patient Active Problem List   Diagnosis    Adjustment disorder with anxiety    Attention deficit hyperactivity disorder (ADHD), combined type    Controlled substance agreement signed    Vitamin D deficiency    Irritable bowel syndrome with diarrhea    Hematemesis    Heartburn    Esophagitis, [place] grade B    Gastritis and duodenitis    Diarrhea    Gastrointestinal tract imaging abnormality    Melena    Headache disorder       Allergies   Allergen Reactions    Amoxicillin Rash     Outpatient Medications Marked as Taking for the 5/16/22 encounter (Telemedicine) with Sabrina Valadez MD   Medication Sig Dispense Refill    amphetamine-dextroamphetamine (ADDERALL, 15MG,) 15 MG tablet Take 1 tablet by mouth 2 times daily for 30 days.  60 tablet 0    pantoprazole (PROTONIX) 40 MG tablet Take 1 tablet by mouth 2 times daily (before meals) 60 tablet 5         Review of Systems: 14 systems were negative except of what was stated on HPI    Nursing note and vitals reviewed. There were no vitals filed for this visit. Wt Readings from Last 3 Encounters:   02/07/22 168 lb (76.2 kg)   02/02/22 167 lb (75.8 kg)   11/11/21 164 lb (74.4 kg)     BP Readings from Last 3 Encounters:   02/07/22 130/70   02/02/22 126/84   11/11/21 132/80     There is no height or weight on file to calculate BMI. Constitutional: Patient appears well-developed and well-nourished. No distress. Head: Normocephalic and atraumatic. Psychiatric: Normal mood and affect.  Behavior is normal.

## 2023-06-21 ENCOUNTER — HOSPITAL ENCOUNTER (EMERGENCY)
Age: 38
Discharge: HOME | End: 2023-06-21
Attending: EMERGENCY MEDICINE
Payer: COMMERCIAL

## 2023-06-21 ENCOUNTER — APPOINTMENT (OUTPATIENT)
Dept: GENERAL RADIOLOGY | Age: 38
End: 2023-06-21
Attending: EMERGENCY MEDICINE
Payer: COMMERCIAL

## 2023-06-21 VITALS
DIASTOLIC BLOOD PRESSURE: 97 MMHG | BODY MASS INDEX: 25.76 KG/M2 | SYSTOLIC BLOOD PRESSURE: 146 MMHG | OXYGEN SATURATION: 99 % | HEART RATE: 87 BPM | WEIGHT: 170 LBS | RESPIRATION RATE: 16 BRPM | HEIGHT: 68 IN | TEMPERATURE: 98.2 F

## 2023-06-21 DIAGNOSIS — M79.645 PAIN OF FINGER OF LEFT HAND: Primary | ICD-10-CM

## 2023-06-21 PROCEDURE — 99283 EMERGENCY DEPT VISIT LOW MDM: CPT

## 2023-06-21 PROCEDURE — 73130 X-RAY EXAM OF HAND: CPT

## 2023-06-21 ASSESSMENT — PAIN - FUNCTIONAL ASSESSMENT
PAIN_FUNCTIONAL_ASSESSMENT: NONE - DENIES PAIN
PAIN_FUNCTIONAL_ASSESSMENT: NONE - DENIES PAIN

## 2023-06-21 NOTE — ED PROVIDER NOTES
[address] ENCOUNTER        Patient Name: Daniel Roberts  MRN: [id number]  [place] 1985  Date of evaluation: 6/21/2023  Provider: Jose Roach MD  PCP: Margi Wright MD  Note Started: 6:51 PM EDT 6/21/23    CHIEF COMPLAINT       Finger Pain (Pt states he cut fingers off with a saw last year, has continued pain in middle finger. Pt states \"I think it is out of socket. \" )      HISTORY OF PRESENT ILLNESS: 1 or more Elements     History from : Patient    Limitations to history : None    Daniel Roberts is a 40 y.o. male who presents for evaluation of finger pain. He states that he had a table saw accident in February 2022 where he had traumatically amputated fingers of the left hand. He states that he had seen a hand doctor through [facility] initially for this. He states that he feels a divot at left middle finger knuckle and is concerned about a potential dislocation. He states that he has noticed this for many months at this point in time. No fevers or swelling or redness of the finger. He has not recently followed up with a hand doctor. Nursing Notes were all reviewed and agreed with or any disagreements were addressed in the HPI. REVIEW OF SYSTEMS :      Review of Systems    Positives and Pertinent negatives as per HPI. SURGICAL HISTORY     Past Surgical History:   Procedure Laterality Date    COLONOSCOPY N/A 11/13/2020    COLONOSCOPY WITH BIOPSY performed by Valeri Min MD at [address] [facility]  2013    UPPER GASTROINTESTINAL ENDOSCOPY N/A 11/13/2020    EGD BIOPSY performed by Valeri Min MD at [address] [facility]       Previous Medications    AMPHETAMINE-DEXTROAMPHETAMINE (ADDERALL, 15MG,) 15 MG TABLET    Take 1 tablet by mouth 2 times daily for 30 days.     AMPHETAMINE-DEXTROAMPHETAMINE (ADDERALL, 15MG,) 15 MG TABLET    Take 1 tablet by mouth 2 times

## 2023-06-21 NOTE — DISCHARGE INSTRUCTIONS
As discussed the x-ray does not show signs of dislocation. Please schedule a second opinion appointment with the hand surgeon provided above.

## 2024-06-09 ENCOUNTER — APPOINTMENT (OUTPATIENT)
Dept: GENERAL RADIOLOGY | Age: 39
End: 2024-06-09
Payer: COMMERCIAL

## 2024-06-09 ENCOUNTER — HOSPITAL ENCOUNTER (EMERGENCY)
Age: 39
Discharge: HOME | End: 2024-06-09
Attending: EMERGENCY MEDICINE
Payer: COMMERCIAL

## 2024-06-09 VITALS
DIASTOLIC BLOOD PRESSURE: 84 MMHG | WEIGHT: 172.2 LBS | HEART RATE: 63 BPM | TEMPERATURE: 97.9 F | HEIGHT: 67 IN | SYSTOLIC BLOOD PRESSURE: 129 MMHG | OXYGEN SATURATION: 99 % | BODY MASS INDEX: 27.03 KG/M2 | RESPIRATION RATE: 16 BRPM

## 2024-06-09 DIAGNOSIS — S22.32XA CLOSED FRACTURE OF ONE RIB OF LEFT SIDE, INITIAL ENCOUNTER: Primary | ICD-10-CM

## 2024-06-09 PROCEDURE — 71101 X-RAY EXAM UNILAT RIBS/CHEST: CPT

## 2024-06-09 PROCEDURE — 99283 EMERGENCY DEPT VISIT LOW MDM: CPT

## 2024-06-09 PROCEDURE — 6370000000 HC RX 637 (ALT 250 FOR IP): Performed by: EMERGENCY MEDICINE

## 2024-06-09 RX ORDER — ACETAMINOPHEN 500 MG
1000 TABLET ORAL ONCE
Status: COMPLETED | OUTPATIENT
Start: 2024-06-09 | End: 2024-06-09

## 2024-06-09 RX ORDER — LIDOCAINE 4 G/G
1 PATCH TOPICAL DAILY
Status: DISCONTINUED | OUTPATIENT
Start: 2024-06-09 | End: 2024-06-09 | Stop reason: HOSPADM

## 2024-06-09 RX ORDER — METHOCARBAMOL 750 MG/1
750 TABLET, FILM COATED ORAL 3 TIMES DAILY
Qty: 30 TABLET | Refills: 0 | Status: SHIPPED | OUTPATIENT
Start: 2024-06-09 | End: 2024-06-19

## 2024-06-09 RX ORDER — ACETAMINOPHEN 500 MG
500 TABLET ORAL EVERY 6 HOURS PRN
Qty: 20 TABLET | Refills: 0 | Status: SHIPPED | OUTPATIENT
Start: 2024-06-09

## 2024-06-09 RX ORDER — IBUPROFEN 800 MG/1
800 TABLET ORAL EVERY 8 HOURS PRN
Qty: 24 TABLET | Refills: 0 | Status: SHIPPED | OUTPATIENT
Start: 2024-06-09

## 2024-06-09 RX ORDER — LIDOCAINE 50 MG/G
1 PATCH TOPICAL EVERY 24 HOURS
Qty: 15 PATCH | Refills: 0 | Status: SHIPPED | OUTPATIENT
Start: 2024-06-09 | End: 2024-06-24

## 2024-06-09 RX ORDER — IBUPROFEN 400 MG/1
800 TABLET ORAL ONCE
Status: COMPLETED | OUTPATIENT
Start: 2024-06-09 | End: 2024-06-09

## 2024-06-09 RX ORDER — TRAMADOL HYDROCHLORIDE 50 MG/1
50 TABLET ORAL EVERY 6 HOURS PRN
Qty: 10 TABLET | Refills: 0 | Status: SHIPPED | OUTPATIENT
Start: 2024-06-09 | End: 2024-06-12

## 2024-06-09 RX ADMIN — ACETAMINOPHEN 1000 MG: 500 TABLET ORAL at 11:39

## 2024-06-09 RX ADMIN — IBUPROFEN 800 MG: 400 TABLET, FILM COATED ORAL at 11:39

## 2024-06-09 ASSESSMENT — LIFESTYLE VARIABLES: HOW OFTEN DO YOU HAVE A DRINK CONTAINING ALCOHOL: 2-3 TIMES A WEEK

## 2024-06-09 ASSESSMENT — PAIN DESCRIPTION - LOCATION: LOCATION: BACK;RIB CAGE

## 2024-06-09 ASSESSMENT — PAIN DESCRIPTION - ORIENTATION: ORIENTATION: LEFT;UPPER

## 2024-06-09 ASSESSMENT — PAIN SCALES - GENERAL
PAINLEVEL_OUTOF10: 4
PAINLEVEL_OUTOF10: 4

## 2024-06-09 ASSESSMENT — PAIN - FUNCTIONAL ASSESSMENT
PAIN_FUNCTIONAL_ASSESSMENT: 0-10
PAIN_FUNCTIONAL_ASSESSMENT: 0-10

## 2024-06-09 NOTE — DISCHARGE INSTRUCTIONS
Take deep breaths to prevent pneumonia  Take Tylenol every 4 hours  Take ibuprofen every 6-8 hours  Use the lidocaine patch 12 hours on 12 hours off  Take tramadol for severe pain

## 2024-06-09 NOTE — ED PROVIDER NOTES
[facility] ENDOSCOPY         CURRENT MEDICATIONS       Previous Medications    AMPHETAMINE-DEXTROAMPHETAMINE (ADDERALL, 15MG,) 15 MG TABLET    Take 1 tablet by mouth 2 times daily for 30 days.    AMPHETAMINE-DEXTROAMPHETAMINE (ADDERALL, 15MG,) 15 MG TABLET    Take 1 tablet by mouth 2 times daily for 30 days.    AMPHETAMINE-DEXTROAMPHETAMINE (ADDERALL, 15MG,) 15 MG TABLET    Take 1 tablet by mouth 2 times daily for 30 days.    PANTOPRAZOLE (PROTONIX) 40 MG TABLET    Take 1 tablet by mouth 2 times daily (before meals)    SUMATRIPTAN (IMITREX) 100 MG TABLET    Take 1 tablet by mouth once as needed for Migraine Repeat in 2 hrs prn, max 2 pills within 24 hr       ALLERGIES     Amoxicillin    FAMILY HISTORY       Family History   Problem Relation Age of Onset    No Known Problems Mother     Heart Disease Father         2 heart attack     Heart Attack Father 40    No Known Problems Sister     No Known Problems Brother     Cancer Maternal Grandfather         colon cancer     Diabetes Paternal Grandmother     Cancer Paternal Grandmother         cervical cancer           SOCIAL HISTORY       Social History     Socioeconomic History    Marital status: Single     Spouse name: None    Number of children: None    Years of education: None    Highest education level: None   Tobacco Use    Smoking status: Every Day     Current packs/day: 1.00     Average packs/day: 1 pack/day for 10.0 years (10.0 ttl pk-yrs)     Types: Cigarettes    Smokeless tobacco: Never   Vaping Use    Vaping Use: Former    Substances: Always   Substance and Sexual Activity    Alcohol use: Yes     Comment: occasionally 1-2 per month    Drug use: No    Sexual activity: Yes     Partners: Female     Social Determinants of Health     Financial Resource Strain: Low Risk  (11/11/2021)    Overall Financial Resource Strain (CARDIA)     Difficulty of Paying Living Expenses: Not hard at all   Food Insecurity: No Food Insecurity (11/11/2021)    Hunger Vital Sign     Worried

## 2024-07-16 ENCOUNTER — HOSPITAL ENCOUNTER (EMERGENCY)
Age: 39
Discharge: HOME | End: 2024-07-16
Attending: EMERGENCY MEDICINE

## 2024-07-16 ENCOUNTER — APPOINTMENT (OUTPATIENT)
Dept: GENERAL RADIOLOGY | Age: 39
End: 2024-07-16

## 2024-07-16 VITALS
TEMPERATURE: 97.8 F | HEIGHT: 68 IN | DIASTOLIC BLOOD PRESSURE: 61 MMHG | OXYGEN SATURATION: 97 % | BODY MASS INDEX: 26.52 KG/M2 | SYSTOLIC BLOOD PRESSURE: 105 MMHG | RESPIRATION RATE: 16 BRPM | WEIGHT: 175 LBS | HEART RATE: 76 BPM

## 2024-07-16 DIAGNOSIS — S93.402A SPRAIN OF LEFT ANKLE, UNSPECIFIED LIGAMENT, INITIAL ENCOUNTER: Primary | ICD-10-CM

## 2024-07-16 PROCEDURE — 99283 EMERGENCY DEPT VISIT LOW MDM: CPT

## 2024-07-16 PROCEDURE — 6370000000 HC RX 637 (ALT 250 FOR IP): Performed by: EMERGENCY MEDICINE

## 2024-07-16 PROCEDURE — 73610 X-RAY EXAM OF ANKLE: CPT

## 2024-07-16 RX ORDER — ONDANSETRON 4 MG/1
8 TABLET, ORALLY DISINTEGRATING ORAL ONCE
Status: COMPLETED | OUTPATIENT
Start: 2024-07-16 | End: 2024-07-16

## 2024-07-16 RX ORDER — HYDROCODONE BITARTRATE AND ACETAMINOPHEN 5; 325 MG/1; MG/1
1 TABLET ORAL ONCE
Status: COMPLETED | OUTPATIENT
Start: 2024-07-16 | End: 2024-07-16

## 2024-07-16 RX ADMIN — ONDANSETRON 8 MG: 4 TABLET, ORALLY DISINTEGRATING ORAL at 05:19

## 2024-07-16 RX ADMIN — HYDROCODONE BITARTRATE AND ACETAMINOPHEN 1 TABLET: 5; 325 TABLET ORAL at 05:20

## 2024-07-16 ASSESSMENT — PAIN DESCRIPTION - LOCATION
LOCATION: ANKLE
LOCATION: ANKLE

## 2024-07-16 ASSESSMENT — PAIN DESCRIPTION - DESCRIPTORS
DESCRIPTORS: ACHING
DESCRIPTORS: STABBING;POUNDING

## 2024-07-16 ASSESSMENT — PAIN SCALES - GENERAL
PAINLEVEL_OUTOF10: 10
PAINLEVEL_OUTOF10: 10

## 2024-07-16 ASSESSMENT — PAIN DESCRIPTION - ORIENTATION
ORIENTATION: LEFT
ORIENTATION: LEFT

## 2024-07-16 ASSESSMENT — PAIN - FUNCTIONAL ASSESSMENT
PAIN_FUNCTIONAL_ASSESSMENT: ACTIVITIES ARE NOT PREVENTED
PAIN_FUNCTIONAL_ASSESSMENT: 0-10

## 2024-07-16 ASSESSMENT — LIFESTYLE VARIABLES
HOW OFTEN DO YOU HAVE A DRINK CONTAINING ALCOHOL: MONTHLY OR LESS
HOW MANY STANDARD DRINKS CONTAINING ALCOHOL DO YOU HAVE ON A TYPICAL DAY: 1 OR 2

## 2024-07-16 ASSESSMENT — PAIN DESCRIPTION - ONSET: ONSET: SUDDEN

## 2024-07-16 ASSESSMENT — PAIN DESCRIPTION - PAIN TYPE: TYPE: ACUTE PAIN

## 2024-07-16 ASSESSMENT — PAIN DESCRIPTION - FREQUENCY: FREQUENCY: CONTINUOUS

## 2024-07-16 NOTE — ED PROVIDER NOTES
EMERGENCY DEPARTMENT ENCOUNTER     [facility] ED     Pt Name: Bobby Thomson   MRN: [id number]   Birthdate 1985   Date of evaluation: 7/16/2024   Provider: Tanya Rosenberg MD   PCP: Caitlin Valadez MD   Note Started: 4:43 AM EDT 7/16/24     CHIEF COMPLAINT     Chief Complaint   Patient presents with    Ankle Pain     Pt arrives with c/o left ankle pain d/t jumping off wall at about 5 foot. Pt states ankle did not twist, but states he put to much weight on the left foot.         HISTORY OF PRESENT ILLNESS:  History from : Patient   Limitations to history : None     Bobby Thomson is a 38 y.o. male who presents for evaluation of left ankle pain.  Patient states that he had jumped off his child's play structure and landed on his left ankle, this was from a height of about 5 feet.  He states that he was initially able to put some weight on it however now there is some increased swelling.  He did have a set of crutches from a prior injury.  Denies any other injuries.    Nursing Notes were all reviewed and agreed with or any disagreements were addressed in the HPI.     ROS: Positives and Pertinent negatives as per HPI.    PAST MEDICAL HISTORY     Past medical history:  has a past medical history of ADD (attention deficit disorder) (11/17/2016), GERD (gastroesophageal reflux disease), Headache disorder (2/1/2021), and Irritable bowel syndrome with diarrhea (2/20/2020).    Past surgical history:  has a past surgical history that includes Nasal septum surgery (2013); Colonoscopy (N/A, 11/13/2020); and Upper gastrointestinal endoscopy (N/A, 11/13/2020).      PHYSICAL EXAM:  ED Triage Vitals [07/16/24 0441]   BP Temp Temp Source Pulse Respirations SpO2 Height Weight - Scale   119/72 97.8 °F (36.6 °C) Oral 91 17 96 % 1.727 m (5' 8\") 79.4 kg (175 lb)        Physical Exam   No acute distress, no respiratory distress.  There is mild swelling to left ankle no overlying erythema.  There is

## 2024-07-16 NOTE — DISCHARGE INSTRUCTIONS
The x-ray did not show any obvious fractures of the ankle.  You likely have a ligament or tendon injury.  Please schedule follow-up with the orthopedic doctor.  You may take Tylenol or ibuprofen for pain or discomfort.

## 2024-08-26 ENCOUNTER — OFFICE VISIT (OUTPATIENT)
Dept: ORTHOPEDIC SURGERY | Age: 39
End: 2024-08-26
Payer: MEDICAID

## 2024-08-26 VITALS — BODY MASS INDEX: 26.52 KG/M2 | HEIGHT: 68 IN | WEIGHT: 175 LBS

## 2024-08-26 DIAGNOSIS — M25.572 LEFT ANKLE PAIN, UNSPECIFIED CHRONICITY: ICD-10-CM

## 2024-08-26 DIAGNOSIS — S82.873A CLOSED FRACTURE OF TIBIAL PLAFOND: ICD-10-CM

## 2024-08-26 DIAGNOSIS — S90.02XA CONTUSION OF LEFT ANKLE, INITIAL ENCOUNTER: Primary | ICD-10-CM

## 2024-08-26 PROCEDURE — 99204 OFFICE O/P NEW MOD 45 MIN: CPT | Performed by: ORTHOPAEDIC SURGERY

## 2024-08-26 RX ORDER — MELOXICAM 15 MG/1
15 TABLET ORAL DAILY PRN
Qty: 30 TABLET | Refills: 0 | Status: SHIPPED | OUTPATIENT
Start: 2024-08-26

## 2024-08-26 NOTE — PROGRESS NOTES
ORTHOPAEDIC SURGERY H&P / CONSULTATION NOTE    Chief complaint:   Chief Complaint   Patient presents with    Ankle Pain     NP L ANKLE      History of present illness: The patient is a 38 y.o. male with subjective symptoms of left ankle pain. The chief complaint is located at left ankle, deep inside. Duration of symptoms has been for 6 weeks. The severity of symptoms is rated at 4/10 pain on intake form.  Patient states that he jumped off the child's playpen on 7/16/2024.  He had pain in the ankle thereafter and went to the emergency room.  Please see that note for details.  Patient ultimately was given a boot and utilize it and had use of crutches and had improvement in his pain over several weeks however he still has constant soreness deep in the anterior aspect of the left ankle.  He denies physical therapy.  He denies injections.  He denies previous injury to the ankle such as this    The patient has tried the below listed items prior to today's consultation for above listed chief complaint.     -   Over-the-counter anti-inflammatories/prescription medication anti-inflammatory.     -   Physical therapy / guided home exercise program -     -   Previous corticosteroid injections    Past medical history:    Past Medical History:   Diagnosis Date    ADD (attention deficit disorder) 11/17/2016    GERD (gastroesophageal reflux disease)     Headache disorder 2/1/2021    Irritable bowel syndrome with diarrhea 2/20/2020        Past surgical history:    Past Surgical History:   Procedure Laterality Date    COLONOSCOPY N/A 11/13/2020    COLONOSCOPY WITH BIOPSY performed by Carlos Manuel Matta MD at [facility] ENDOSCOPY    NASAL SEPTUM SURGERY  2013    UPPER GASTROINTESTINAL ENDOSCOPY N/A 11/13/2020    EGD BIOPSY performed by Carlos Manuel Matta MD at [facility] ENDOSCOPY        Allergies:    Allergies   Allergen Reactions    Amoxicillin Rash         Medications:   Current Outpatient Medications:     meloxicam (MOBIC) 15 MG tablet,

## (undated) DEVICE — KIT INF CTRL 2OZ LUB TBNG L12FT DBL END BRSH SYR OP4

## (undated) DEVICE — CONMED SCOPE SAVER BITE BLOCK, 20X27 MM: Brand: SCOPE SAVER

## (undated) DEVICE — AIRLIFE™ NASAL OXYGEN CANNULA CURVED, FLARED TIP, WITH 7 FEET (2.1 M) CRUSH RESISTANT TUBING, OVER-THE-EAR STYLE: Brand: AIRLIFE™

## (undated) DEVICE — FORCEPS BX L240CM DIA2.4MM L NDL RAD JAW 4 133340

## (undated) DEVICE — Z DISCONTINUED USE 2276105 GOWN PROTCT UNIV CHST W28IN L49IN SL 24IN BLU SPUNBOND FLM

## (undated) DEVICE — ELECTRODE ECG MONITR FOAM TEAR DROP ADLT RED